# Patient Record
Sex: FEMALE | Race: ASIAN | NOT HISPANIC OR LATINO | Employment: OTHER | URBAN - METROPOLITAN AREA
[De-identification: names, ages, dates, MRNs, and addresses within clinical notes are randomized per-mention and may not be internally consistent; named-entity substitution may affect disease eponyms.]

---

## 2021-07-16 ENCOUNTER — TRANSFERRED RECORDS (OUTPATIENT)
Dept: HEALTH INFORMATION MANAGEMENT | Facility: CLINIC | Age: 86
End: 2021-07-16

## 2021-07-16 ENCOUNTER — LAB REQUISITION (OUTPATIENT)
Dept: LAB | Facility: CLINIC | Age: 86
End: 2021-07-16

## 2021-07-16 ENCOUNTER — MEDICAL CORRESPONDENCE (OUTPATIENT)
Dept: HEALTH INFORMATION MANAGEMENT | Facility: CLINIC | Age: 86
End: 2021-07-16

## 2021-07-16 DIAGNOSIS — R20.2 PARESTHESIA OF SKIN: ICD-10-CM

## 2021-07-16 DIAGNOSIS — F09 UNSPECIFIED MENTAL DISORDER DUE TO KNOWN PHYSIOLOGICAL CONDITION: ICD-10-CM

## 2021-07-16 LAB
ALBUMIN SERPL-MCNC: 3.8 G/DL (ref 3.5–5)
ALP SERPL-CCNC: 84 U/L (ref 45–120)
ALT SERPL W P-5'-P-CCNC: 19 U/L (ref 0–45)
ANION GAP SERPL CALCULATED.3IONS-SCNC: 14 MMOL/L (ref 5–18)
AST SERPL W P-5'-P-CCNC: 19 U/L (ref 0–40)
BILIRUB SERPL-MCNC: 0.5 MG/DL (ref 0–1)
BUN SERPL-MCNC: 25 MG/DL (ref 8–28)
CALCIUM SERPL-MCNC: 9.2 MG/DL (ref 8.5–10.5)
CHLORIDE BLD-SCNC: 106 MMOL/L (ref 98–107)
CO2 SERPL-SCNC: 19 MMOL/L (ref 22–31)
CREAT SERPL-MCNC: 0.68 MG/DL (ref 0.6–1.1)
GFR SERPL CREATININE-BSD FRML MDRD: 80 ML/MIN/1.73M2
GLUCOSE BLD-MCNC: 99 MG/DL (ref 70–125)
MAGNESIUM SERPL-MCNC: 2 MG/DL (ref 1.8–2.6)
POTASSIUM BLD-SCNC: 4.1 MMOL/L (ref 3.5–5)
PROT SERPL-MCNC: 6.8 G/DL (ref 6–8)
SODIUM SERPL-SCNC: 139 MMOL/L (ref 136–145)
TSH SERPL DL<=0.005 MIU/L-ACNC: 0.54 UIU/ML (ref 0.3–5)
VIT B12 SERPL-MCNC: 614 PG/ML (ref 213–816)

## 2021-07-16 PROCEDURE — 82607 VITAMIN B-12: CPT | Performed by: FAMILY MEDICINE

## 2021-07-16 PROCEDURE — 86780 TREPONEMA PALLIDUM: CPT | Performed by: FAMILY MEDICINE

## 2021-07-16 PROCEDURE — 82040 ASSAY OF SERUM ALBUMIN: CPT | Performed by: FAMILY MEDICINE

## 2021-07-16 PROCEDURE — 83735 ASSAY OF MAGNESIUM: CPT | Performed by: FAMILY MEDICINE

## 2021-07-16 PROCEDURE — 84443 ASSAY THYROID STIM HORMONE: CPT | Performed by: FAMILY MEDICINE

## 2021-07-17 LAB — T PALLIDUM AB SER QL: NEGATIVE

## 2021-09-07 ENCOUNTER — TRANSFERRED RECORDS (OUTPATIENT)
Dept: HEALTH INFORMATION MANAGEMENT | Facility: CLINIC | Age: 86
End: 2021-09-07

## 2021-09-16 ENCOUNTER — LAB (OUTPATIENT)
Dept: LAB | Facility: HOSPITAL | Age: 86
End: 2021-09-16
Payer: MEDICARE

## 2021-09-16 ENCOUNTER — OFFICE VISIT (OUTPATIENT)
Dept: NEUROLOGY | Facility: CLINIC | Age: 86
End: 2021-09-16
Payer: MEDICARE

## 2021-09-16 VITALS — HEIGHT: 60 IN | SYSTOLIC BLOOD PRESSURE: 140 MMHG | HEART RATE: 70 BPM | DIASTOLIC BLOOD PRESSURE: 93 MMHG

## 2021-09-16 DIAGNOSIS — R29.810 FACIAL DROOP: Primary | ICD-10-CM

## 2021-09-16 DIAGNOSIS — R52 BURNING PAIN: ICD-10-CM

## 2021-09-16 PROCEDURE — 84165 PROTEIN E-PHORESIS SERUM: CPT | Mod: 26 | Performed by: PATHOLOGY

## 2021-09-16 PROCEDURE — 84425 ASSAY OF VITAMIN B-1: CPT

## 2021-09-16 PROCEDURE — 36415 COLL VENOUS BLD VENIPUNCTURE: CPT

## 2021-09-16 PROCEDURE — 84165 PROTEIN E-PHORESIS SERUM: CPT | Mod: TC

## 2021-09-16 PROCEDURE — 84155 ASSAY OF PROTEIN SERUM: CPT

## 2021-09-16 PROCEDURE — 99205 OFFICE O/P NEW HI 60 MIN: CPT | Performed by: PSYCHIATRY & NEUROLOGY

## 2021-09-16 PROCEDURE — 86334 IMMUNOFIX E-PHORESIS SERUM: CPT | Mod: 26 | Performed by: PATHOLOGY

## 2021-09-16 PROCEDURE — 86334 IMMUNOFIX E-PHORESIS SERUM: CPT | Mod: TC

## 2021-09-16 RX ORDER — PREDNISONE 20 MG/1
TABLET ORAL
COMMUNITY
Start: 2021-09-07

## 2021-09-16 RX ORDER — GABAPENTIN 100 MG/1
CAPSULE ORAL
COMMUNITY
Start: 2021-09-07

## 2021-09-16 RX ORDER — MINERAL OIL AND WHITE PETROLATUM 30; 940 MG/G; MG/G
OINTMENT OPHTHALMIC
COMMUNITY
Start: 2021-09-07 | End: 2022-02-10

## 2021-09-16 RX ORDER — POLYETHYLENE GLYCOL 400 AND PROPYLENE GLYCOL 4; 3 MG/ML; MG/ML
SOLUTION/ DROPS OPHTHALMIC
COMMUNITY
Start: 2021-08-26

## 2021-09-16 RX ORDER — BLOOD SUGAR DIAGNOSTIC
STRIP MISCELLANEOUS
COMMUNITY
Start: 2020-10-05

## 2021-09-16 RX ORDER — VALACYCLOVIR HYDROCHLORIDE 500 MG/1
TABLET, FILM COATED ORAL
COMMUNITY
Start: 2021-09-07 | End: 2022-02-10

## 2021-09-16 NOTE — NURSING NOTE
Chief Complaint   Patient presents with     Numbness     Facial paralysis      Tingling     Burning/hotness on BLE     Ary Cox MA on 9/16/2021 at 11:42 AM

## 2021-09-16 NOTE — PROGRESS NOTES
NEUROLOGY OUTPATIENT CONSULT NOTE   Sep 16, 2021     CHIEF COMPLAINT/REASON FOR VISIT/REASON FOR CONSULT  Patient presents with:  Numbness: Facial paralysis   Tingling: Burning/hotness on BLE    REASON FOR CONSULTATION- Facial droop    REFERRAL SOURCE  Dr. Sugar Weaver   Dr. Sugar Weaver    HISTORY OF PRESENT ILLNESS  Chuckie Burns is a 86 year old female seen today for evaluation of facial droop.  Patient reports that the symptoms came on gradually 11 days ago.  They have been persisting.  She was seen by her primary care doctor and was put on valacyclovir and prednisone which have not made any significant difference.  She has been on these medications for about a week.  The facial droop is on the right side.  Denies any headaches.  Eyes will not bring completely.  Reports no history of hypertension or diabetes.  Does complain of some neck pain.    In addition to the facial droop she also complains of burning feet.  This is more chronic.  She has been put on gabapentin with some benefit significant benefit.  The bottom of the foot and the ankle hard for the last few years.  She does complain of balance and coordination issues.  Does also complain of some generalized weakness and reports that she is not as strong as before    She also has some memory problems though that is not a high priority for the family to get addressed today.  Does complain of some depression as well.    Previous history is reviewed and this is unchanged.    PAST MEDICAL/SURGICAL HISTORY  History reviewed. No pertinent past medical history.  There is no problem list on file for this patient.  Significant for generalized weakness    FAMILY HISTORY  History reviewed. No pertinent family history.   No known neurological problems in the family.  Family history is not well known.    SOCIAL HISTORY  Social History     Tobacco Use     Smoking status: Never Smoker     Smokeless tobacco: Never Used   Substance Use Topics     Alcohol use: Not Currently      Drug use: Never       SYSTEMS REVIEW  Twelve-system ROS was done and positive for neck pain, numbness and tingling, weakness paralysis, difficulty walking, balance coordination problems, dizziness, hearing loss, memory problems, depression.    MEDICATIONS  ACCU-CHEK GUIDE test strip, TEST 1 TIME A DAY  gabapentin (NEURONTIN) 100 MG capsule, TAKE 1-2 PILLS BY MOUTH EVERY BEDTIME AS NEEDED FOR LEG TINGLING/YOG MOB CEG TXHUA HMO NOJ 1-2 LUB THAUM MUS PW  omeprazole (PRILOSEC) 20 MG DR capsule, TAKE 1 PILL BY MOUTH 30 MINUTES BEFORE MORNING MEAL/ TXHUA HNUB NOJ 1 LUB TSHUAJ 30 NASTHIV UA NTEJ NOJ TSHAIS  predniSONE (DELTASONE) 20 MG tablet, TAKE 3 PILLS BY MOUTH EVERY DAY FOR 7 DAYS/ TXHUA HNUB NOJ 3 LUB TSHUAJ BELTRAN 7 HNUB  SYSTANE 0.4-0.3 % SOLN ophthalmic solution, USE 1 DROP INTO BOTH EYES 3 TIMES DAILY/  1 NCOS BELTRAN TXHUA SAB QHOV MUAG 3 ZAUG IB HNUB *45 DAYS*  valACYclovir (VALTREX) 500 MG tablet, TAKE 2 PILLS BY MOUTH 3 TIMES DAILY FOR 7 DAYS/ TXHUA HNUB NOJ 2 LUB TSHUAJ 3 ZAUG BELTRAN 7 HNUB  White Petrolatum-Mineral Oil (SYSTANE NIGHTTIME) OINT,     No current facility-administered medications on file prior to visit.       PHYSICAL EXAMINATION  VITALS: BP (!) 140/93 (BP Location: Right arm, Patient Position: Sitting)   Pulse 70   Ht 1.524 m (5')   GENERAL: Healthy appearing, alert, no acute distress, normal habitus.  CARDIOVASCULAR: Extremities warm and well perfused. Pulses present.   EYES: Funduscopic exam does not reveal any papilledema.   NEUROLOGICAL:  Patient is awake and oriented to self, place and time.  Attention span is normal.  Memory is grossly limited; unable to test due to language barrier.  Language is fluent and follows commands appropriately.  Appropriate fund of knowledge. Cranial nerves 2-12 are intact with right forehead and lower half of the face weakness. There is no pronator drift.  Motor exam shows 5/5 strength in all extremities grossly.  Tone is symmetric bilaterally in upper and  lower extremities.  Reflexes are symmetric and absent in upper extremities and lower extremities. Sensory exam is grossly intact to light touch, pin prick and vibration.  Finger to nose and heel to shin is without dysmetria.  Unable to test Romberg and gait due to safety concerns from ongoing medical issues.    DIAGNOSTICS  RELEVANT LABS  A1c 5.7  CBC noncontributory  Component      Latest Ref Rng & Units 7/16/2021   Sodium      136 - 145 mmol/L 139   Potassium      3.5 - 5.0 mmol/L 4.1   Chloride      98 - 107 mmol/L 106   Carbon Dioxide      22 - 31 mmol/L 19 (L)   Anion Gap      5 - 18 mmol/L 14   Urea Nitrogen      8 - 28 mg/dL 25   Creatinine      0.60 - 1.10 mg/dL 0.68   Calcium      8.5 - 10.5 mg/dL 9.2   Glucose      70 - 125 mg/dL 99   Alkaline Phosphatase      45 - 120 U/L 84   AST      0 - 40 U/L 19   ALT      0 - 45 U/L 19   Protein Total      6.0 - 8.0 g/dL 6.8   Albumin      3.5 - 5.0 g/dL 3.8   Bilirubin Total      0.0 - 1.0 mg/dL 0.5   GFR Estimate      >60 mL/min/1.73m2 80   TSH      0.30 - 5.00 uIU/mL 0.54   Vitamin B12      213 - 816 pg/mL 614   Magnesium      1.8 - 2.6 mg/dL 2.0   Treponema Antibodies      Negative Negative         OUTSIDE RECORDS  Outside referral notes and chart notes were reviewed and pertinent information has been summarized (in addition to the HPI):-Patient referred for burning feet, generalized weakness, dementia. Complains of burning feet for the last 4 to 5 years.  Has a boot on her foot which she wears for foot swelling.  Does not completely fit the foot.  Family is also decided but history of stroke versus Bell's palsy.  Has left hand tingling which is chronic wears a splint.  Tingling is worse at night.  Hand and feet.  There are some history of diabetes.  Is on duloxetine, gabapentin for paresthesias.      IMPRESSION/REPORT/PLAN  Facial droop - r/o Lenorah palsy  Burning pain-rule out neuropathy neuropathic pain    This is a 86 year old female with new onset of right  facial droop.  Examination is suggestive of Bell's palsy though I would like to get a MRI of the brain to rule out stroke.  She is already been treated with valacyclovir and prednisone.  Discussed prognosis with the family.    In terms of the burning pain sensation in her feet her examination is suggestive of neuropathy.  We will check a EMG to confirm the diagnosis.  We will check blood work to look for causes of neuropathy.  Basic labs checked through primary care noncontributory.  She can continue gabapentin for the neuropathic pain.    I can see her back in 1 month after the testing.    -     MR Brain w/o Contrast; Future  -     EMG; Future  -     Protein electrophoresis; Future  -     Protein Immunofixation Serum; Future  -     Vitamin B1 whole blood; Future     Return in about 1 month (around 10/16/2021) for In-Clinic Visit, After testing.    Over 66 minutes were spent coordinating the care for the patient on the day of the encounter.  This includes previsit, during visit and post visit activities as documented above.  Extra time due to use of .  Reviewing outside records for multiple problems addressed/discussed.  Ordering multiple tests.  (Activities include but not inclusive of reviewing chart, reviewing outside records, reviewing labs and imaging study results as well as the images, patient visit time including getting history and exam,  use if applicable, review of test results with the patient and coming up with a plan in a shared model, counseling patient and family, education and answering patient questions, EMR , EMR diagnosis entry and problem list management, medication reconciliation and prescription management if applicable, paperwork if applicable, printing documents and documentation of the visit activities.)  Billing:-4 data points, 4 problem points, 4 risk points.      Jos Rubio MD  Neurologist  [facility]rology [facility]  Tel:-  [phone]    This note was dictated using voice recognition software.  Any grammatical or context distortions are unintentional and inherent to the software.

## 2021-09-16 NOTE — LETTER
9/16/2021         RE: Chuckie Burns  [address]  [place] [facility] [address]        Dear Colleague,    Thank you for referring your patient, Chuckie Burns, to the [facility] [place]. Please see a copy of my visit note below.    NEUROLOGY OUTPATIENT CONSULT NOTE   Sep 16, 2021     CHIEF COMPLAINT/REASON FOR VISIT/REASON FOR CONSULT  Patient presents with:  Numbness: Facial paralysis   Tingling: Burning/hotness on BLE    REASON FOR CONSULTATION- Facial droop    REFERRAL SOURCE  Dr. Sugar Weaver  CC Dr. Sugar Weaver    HISTORY OF PRESENT ILLNESS  Chuckie Burns is a 86 year old female seen today for evaluation of facial droop.  Patient reports that the symptoms came on gradually 11 days ago.  They have been persisting.  She was seen by her primary care doctor and was put on valacyclovir and prednisone which have not made any significant difference.  She has been on these medications for about a week.  The facial droop is on the right side.  Denies any headaches.  Eyes will not bring completely.  Reports no history of hypertension or diabetes.  Does complain of some neck pain.    In addition to the facial droop she also complains of burning feet.  This is more chronic.  She has been put on gabapentin with some benefit significant benefit.  The bottom of the foot and the ankle hard for the last few years.  She does complain of balance and coordination issues.  Does also complain of some generalized weakness and reports that she is not as strong as before    She also has some memory problems though that is not a high priority for the family to get addressed today.  Does complain of some depression as well.    Previous history is reviewed and this is unchanged.    PAST MEDICAL/SURGICAL HISTORY  History reviewed. No pertinent past medical history.  There is no problem list on file for this patient.  Significant for generalized weakness    FAMILY HISTORY  History reviewed. No pertinent family history.   No  known neurological problems in the family.  Family history is not well known.    SOCIAL HISTORY  Social History     Tobacco Use     Smoking status: Never Smoker     Smokeless tobacco: Never Used   Substance Use Topics     Alcohol use: Not Currently     Drug use: Never       SYSTEMS REVIEW  Twelve-system ROS was done and positive for neck pain, numbness and tingling, weakness paralysis, difficulty walking, balance coordination problems, dizziness, hearing loss, memory problems, depression.    MEDICATIONS  ACCU-CHEK GUIDE test strip, TEST 1 TIME A DAY  gabapentin (NEURONTIN) 100 MG capsule, TAKE 1-2 PILLS BY MOUTH EVERY BEDTIME AS NEEDED FOR LEG TINGLING/YOG MOB CEG TXHUA HMO NOJ 1-2 LUB THAUM MUS PW  omeprazole (PRILOSEC) 20 MG DR capsule, TAKE 1 PILL BY MOUTH 30 MINUTES BEFORE MORNING MEAL/ TXHUA HNUB NOJ 1 LUB TSHUAJ 30 NASTHIV UA NTEJ NOJ TSHAIS  predniSONE (DELTASONE) 20 MG tablet, TAKE 3 PILLS BY MOUTH EVERY DAY FOR 7 DAYS/ TXHUA HNUB NOJ 3 LUB TSHUAJ BELTRAN 7 HNUB  SYSTANE 0.4-0.3 % SOLN ophthalmic solution, USE 1 DROP INTO BOTH EYES 3 TIMES DAILY/  1 NCOS BELTRAN TXHUA SAB QHOV MUAG 3 ZAUG IB HNUB *45 DAYS*  valACYclovir (VALTREX) 500 MG tablet, TAKE 2 PILLS BY MOUTH 3 TIMES DAILY FOR 7 DAYS/ TXHUA HNUB NOJ 2 LUB TSHUAJ 3 ZAUG BELTRAN 7 HNUB  White Petrolatum-Mineral Oil (SYSTANE NIGHTTIME) OINT,     No current facility-administered medications on file prior to visit.       PHYSICAL EXAMINATION  VITALS: BP (!) 140/93 (BP Location: Right arm, Patient Position: Sitting)   Pulse 70   Ht 1.524 m (5')   GENERAL: Healthy appearing, alert, no acute distress, normal habitus.  CARDIOVASCULAR: Extremities warm and well perfused. Pulses present.   EYES: Funduscopic exam does not reveal any papilledema.   NEUROLOGICAL:  Patient is awake and oriented to self, place and time.  Attention span is normal.  Memory is grossly limited; unable to test due to language barrier.  Language is fluent and follows commands appropriately.   Appropriate fund of knowledge. Cranial nerves 2-12 are intact with right forehead and lower half of the face weakness. There is no pronator drift.  Motor exam shows 5/5 strength in all extremities grossly.  Tone is symmetric bilaterally in upper and lower extremities.  Reflexes are symmetric and absent in upper extremities and lower extremities. Sensory exam is grossly intact to light touch, pin prick and vibration.  Finger to nose and heel to shin is without dysmetria.  Unable to test Romberg and gait due to safety concerns from ongoing medical issues.    DIAGNOSTICS  RELEVANT LABS  A1c 5.7  CBC noncontributory  Component      Latest Ref Rng & Units 7/16/2021   Sodium      136 - 145 mmol/L 139   Potassium      3.5 - 5.0 mmol/L 4.1   Chloride      98 - 107 mmol/L 106   Carbon Dioxide      22 - 31 mmol/L 19 (L)   Anion Gap      5 - 18 mmol/L 14   Urea Nitrogen      8 - 28 mg/dL 25   Creatinine      0.60 - 1.10 mg/dL 0.68   Calcium      8.5 - 10.5 mg/dL 9.2   Glucose      70 - 125 mg/dL 99   Alkaline Phosphatase      45 - 120 U/L 84   AST      0 - 40 U/L 19   ALT      0 - 45 U/L 19   Protein Total      6.0 - 8.0 g/dL 6.8   Albumin      3.5 - 5.0 g/dL 3.8   Bilirubin Total      0.0 - 1.0 mg/dL 0.5   GFR Estimate      >60 mL/min/1.73m2 80   TSH      0.30 - 5.00 uIU/mL 0.54   Vitamin B12      213 - 816 pg/mL 614   Magnesium      1.8 - 2.6 mg/dL 2.0   Treponema Antibodies      Negative Negative         OUTSIDE RECORDS  Outside referral notes and chart notes were reviewed and pertinent information has been summarized (in addition to the HPI):-Patient referred for burning feet, generalized weakness, dementia. Complains of burning feet for the last 4 to 5 years.  Has a boot on her foot which she wears for foot swelling.  Does not completely fit the foot.  Family is also decided but history of stroke versus Bell's palsy.  Has left hand tingling which is chronic wears a splint.  Tingling is worse at night.  Hand and feet.   There are some history of diabetes.  Is on duloxetine, gabapentin for paresthesias.      IMPRESSION/REPORT/PLAN  Facial droop - r/o Gabriels palsy  Burning pain-rule out neuropathy neuropathic pain    This is a 86 year old female with new onset of right facial droop.  Examination is suggestive of Bell's palsy though I would like to get a MRI of the brain to rule out stroke.  She is already been treated with valacyclovir and prednisone.  Discussed prognosis with the family.    In terms of the burning pain sensation in her feet her examination is suggestive of neuropathy.  We will check a EMG to confirm the diagnosis.  We will check blood work to look for causes of neuropathy.  Basic labs checked through primary care noncontributory.  She can continue gabapentin for the neuropathic pain.    I can see her back in 1 month after the testing.    -     MR Brain w/o Contrast; Future  -     EMG; Future  -     Protein electrophoresis; Future  -     Protein Immunofixation Serum; Future  -     Vitamin B1 whole blood; Future     Return in about 1 month (around 10/16/2021) for In-Clinic Visit, After testing.    Over 66 minutes were spent coordinating the care for the patient on the day of the encounter.  This includes previsit, during visit and post visit activities as documented above.  Extra time due to use of .  Reviewing outside records for multiple problems addressed/discussed.  Ordering multiple tests.  (Activities include but not inclusive of reviewing chart, reviewing outside records, reviewing labs and imaging study results as well as the images, patient visit time including getting history and exam,  use if applicable, review of test results with the patient and coming up with a plan in a shared model, counseling patient and family, education and answering patient questions, EMR , EMR diagnosis entry and problem list management, medication reconciliation and prescription management if  applicable, paperwork if applicable, printing documents and documentation of the visit activities.)  Billing:-4 data points, 4 problem points, 4 risk points.      Jos Rubio MD  Neurologist  [facility]rology [facility]  Tel:- [phone]    This note was dictated using voice recognition software.  Any grammatical or context distortions are unintentional and inherent to the software.        Again, thank you for allowing me to participate in the care of your patient.        Sincerely,        Jos Rubio MD

## 2021-09-17 LAB — TOTAL PROTEIN SERUM FOR ELP: 6.7 G/DL (ref 6–8)

## 2021-09-20 LAB
ALBUMIN PERCENT: 61.8 % (ref 51–67)
ALBUMIN SERPL ELPH-MCNC: 4.1 G/DL (ref 3.2–4.7)
ALPHA 1 PERCENT: 2.2 % (ref 2–4)
ALPHA 2 PERCENT: 9.8 % (ref 5–13)
ALPHA1 GLOB SERPL ELPH-MCNC: 0.1 G/DL (ref 0.1–0.3)
ALPHA2 GLOB SERPL ELPH-MCNC: 0.7 G/DL (ref 0.4–0.9)
B-GLOBULIN SERPL ELPH-MCNC: 0.8 G/DL (ref 0.7–1.2)
BETA PERCENT: 11.2 % (ref 10–17)
GAMMA GLOB SERPL ELPH-MCNC: 1 G/DL (ref 0.6–1.4)
GAMMA GLOBULIN PERCENT: 15 % (ref 9–20)
PATH ICD:: NORMAL
PATH ICD:: NORMAL
PROT PATTERN SERPL ELPH-IMP: NORMAL
PROT PATTERN SERPL IFE-IMP: NORMAL
REVIEWING PATHOLOGIST: NORMAL
REVIEWING PATHOLOGIST: NORMAL
TOTAL PROTEIN SERUM FOR ELP (SYNCED VALUE): 6.7 G/DL

## 2021-09-21 LAB — VIT B1 PYROPHOSHATE BLD-SCNC: 125 NMOL/L

## 2021-09-29 ENCOUNTER — HOSPITAL ENCOUNTER (OUTPATIENT)
Dept: MRI IMAGING | Facility: CLINIC | Age: 86
Discharge: HOME | End: 2021-09-29
Attending: PSYCHIATRY & NEUROLOGY | Admitting: PSYCHIATRY & NEUROLOGY
Payer: MEDICARE

## 2021-09-29 DIAGNOSIS — R29.810 FACIAL DROOP: ICD-10-CM

## 2021-09-29 PROCEDURE — 70551 MRI BRAIN STEM W/O DYE: CPT

## 2022-02-10 ENCOUNTER — OFFICE VISIT (OUTPATIENT)
Dept: NEUROLOGY | Facility: CLINIC | Age: 87
End: 2022-02-10
Attending: PSYCHIATRY & NEUROLOGY
Payer: MEDICARE

## 2022-02-10 ENCOUNTER — OFFICE VISIT (OUTPATIENT)
Dept: NEUROLOGY | Facility: CLINIC | Age: 87
End: 2022-02-10
Payer: MEDICARE

## 2022-02-10 VITALS
SYSTOLIC BLOOD PRESSURE: 139 MMHG | BODY MASS INDEX: 27.29 KG/M2 | DIASTOLIC BLOOD PRESSURE: 87 MMHG | HEART RATE: 61 BPM | HEIGHT: 60 IN | WEIGHT: 139 LBS

## 2022-02-10 DIAGNOSIS — G62.9 NEUROPATHY: ICD-10-CM

## 2022-02-10 DIAGNOSIS — R52 BURNING PAIN: ICD-10-CM

## 2022-02-10 DIAGNOSIS — I63.9 CEREBROVASCULAR ACCIDENT (CVA), UNSPECIFIED MECHANISM (H): Primary | ICD-10-CM

## 2022-02-10 DIAGNOSIS — R20.0 NUMBNESS: Primary | ICD-10-CM

## 2022-02-10 DIAGNOSIS — R29.810 FACIAL DROOP: ICD-10-CM

## 2022-02-10 PROCEDURE — 95886 MUSC TEST DONE W/N TEST COMP: CPT | Mod: RT | Performed by: PSYCHIATRY & NEUROLOGY

## 2022-02-10 PROCEDURE — 95912 NRV CNDJ TEST 11-12 STUDIES: CPT | Performed by: PSYCHIATRY & NEUROLOGY

## 2022-02-10 PROCEDURE — 99215 OFFICE O/P EST HI 40 MIN: CPT | Performed by: PSYCHIATRY & NEUROLOGY

## 2022-02-10 RX ORDER — ASPIRIN 81 MG/1
81 TABLET, CHEWABLE ORAL DAILY
Qty: 90 TABLET | Refills: 1 | Status: SHIPPED | OUTPATIENT
Start: 2022-02-10 | End: 2023-07-10

## 2022-02-10 RX ORDER — GABAPENTIN 300 MG/1
300 CAPSULE ORAL 3 TIMES DAILY
Qty: 270 CAPSULE | Refills: 0 | Status: SHIPPED | OUTPATIENT
Start: 2022-02-10 | End: 2023-01-03

## 2022-02-10 ASSESSMENT — MIFFLIN-ST. JEOR: SCORE: 992

## 2022-02-10 NOTE — PROCEDURES
[facility]     [facility]ly [facility], [address]  [place] [address]  Tel: [phone]  Fax: [phone]          Full Name: Chuckie Burns Gender: Female  Patient ID: [id number] YOB: 1935      Visit Date: 2/10/2022 11:41  Age: 87 Years 1 Months Old  Interpreted By: Jos Rubio MD   Ref Dr.: Sugar Acevedo MD  Tech: ST   Height: 4 feet 9 inch  Reason for referral: Evaluate bilateral lowers. c/o burning in both feet > 20 years. Right = Left.       Motor NCS      Nerve / Sites Lat Amp Dist Bryon    ms mV cm m/s   R Peroneal - EDB      Ankle 3.49 6.4 8       Fib head 8.75 6.0 25.5 48      Pop fossa 10.83 5.8 10 48   L Peroneal - EDB      Ankle 4.11 1.1 8       Fib head 9.01 1.0 24 49      Pop fossa 11.09 0.9 10 48   R Tibial - AH      Ankle 4.17 5.3 8       Pop fossa 11.25 4.4 32 45   L Tibial - AH      Ankle 3.75 6.9 8       Pop fossa 10.52 5.7 29 43       F  Wave      Nerve Fmin    ms   R Tibial - AH 48.28   L Tibial - AH 47.66       Sensory NCS      Nerve / Sites Onset Lat Pk Lat Amp.2-3 Dist Bryon    ms ms  V cm m/s   R Sural - Ankle (Calf)      Calf 2.76 3.39 10.1 14 51   L Sural - Ankle (Calf)      Calf 2.76 3.44 8.7 14 51   R Superficial peroneal - Ankle      Lat leg 1.93 2.60 9.6 12 62   L Superficial peroneal - Ankle      Lat leg 2.08 2.55 8.2 12 58   L Medial plantar, Lateral plantar - Ankle (Medial, lateral sole)      Medial plantar Sole 2.60 3.39 2.9 14 54      Lateral plantar Sole 2.66 3.13 2.3 14 53   R Medial plantar, Lateral plantar - Ankle (Medial, lateral sole)      Medial plantar Sole 2.03 2.86 5.3 14 69      Lateral plantar Sole 1.98 2.50 6.3 14 71       EMG Summary Table     Spontaneous MUAP Rcmt Note   Muscle Fib PSW Fasc IA # Amp Dur PPP Rate Type   R. Gluteus medius None None None N N N N N N N   R. Gluteus aimee None None None N N N N N N N   R. L3 paraspinal None None None N N N N N N N   R. L4 paraspinal  None None None N N N N N N N   R. L5 paraspinal None None None N N N N N N N   R. S1 paraspinal None None None N N N N N N N   R. Adductor houston None None None N N N N N N N   R. Quadriceps None None None N N N N N N N   R. Tibialis anterior None None None N N N N N N N   R. Gastrocnemius (Medial head) None None None N N N N N N N   R. Tibialis posterior None None None N N N N N N N   L. Adductor houston None None None N N N N N N N   L. Quadriceps None None None N N N N N N N   L. Tibialis anterior None None None N N N N N N N   L. Gastrocnemius (Medial head) None None None N N N N N N N   L. Tibialis posterior None None None N N N N N N N       SUMMARY  Nerve conduction and EMG study of bilateral lower extremities shows:    Normal right peroneal distal motor latency, amplitude and conduction velocity.  Normal left peroneal distal motor latency with decreased amplitude and normal conduction velocity.  Normal right tibial distal motor latency, amplitude and conduction velocity.  Normal left tibial distal motor latency, amplitude and conduction velocity.  Normal bilateral sural and superficial peroneal sensory SNAP with low normal amplitudes.  Low normal amplitudes of bilateral medial and lateral plantar nerves.  Monopolar needle exam is normal.      CLINICAL INTERPRETATION:  This is an abnormal nerve conduction and EMG study.  The study shows low normal amplitudes of multiple sensory and motor nerves suggestive of an early sensorimotor polyneuropathy.  Further clinical correlation is needed.     Jos Rubio MD  Neurologist  [facility] Neurology [facility]  Tel:- [phone]

## 2022-02-10 NOTE — PROGRESS NOTES
NEUROLOGY OUTPATIENT PROGRESS NOTE   Feb 10, 2022     CHIEF COMPLAINT/REASON FOR VISIT/REASON FOR CONSULT  Patient presents with:  RECHECK: facial droop and numbness    REASON FOR CONSULTATION- Facial droop    REFERRAL SOURCE  Dr. Sugar Weaver   Dr. Sugar Weaver    HISTORY OF PRESENT ILLNESS  Chuckie Burns is a 87 year old female seen today for evaluation of facial droop.  Patient reports that the symptoms came on gradually 11 days ago.  They have been persisting.  She was seen by her primary care doctor and was put on valacyclovir and prednisone which have not made any significant difference.  She has been on these medications for about a week.  The facial droop is on the right side.  Denies any headaches.  Eyes will not bring completely.  Reports no history of hypertension or diabetes.  Does complain of some neck pain.    In addition to the facial droop she also complains of burning feet.  This is more chronic.  She has been put on gabapentin with some benefit significant benefit.  The bottom of the foot and the ankle hard for the last few years.  She does complain of balance and coordination issues.  Does also complain of some generalized weakness and reports that she is not as strong as before    She also has some memory problems though that is not a high priority for the family to get addressed today.  Does complain of some depression as well.    2/10/22  And returns today  1.  Her facial droop is now completely resolved.  Reports no ongoing symptoms.  Reports no other strokelike symptoms.  No headaches.  2.  She continues to have burning pain in her feet.  Has tried gabapentin which she feels is helping  3.  Continues to complain of balance and coordination issues.    Previous history is reviewed and this is unchanged.    PAST MEDICAL/SURGICAL HISTORY  No past medical history on file.  There is no problem list on file for this patient.  Significant for generalized weakness    FAMILY HISTORY  No family history  on file.   No known neurological problems in the family.  Family history is not well known.    SOCIAL HISTORY  Social History     Tobacco Use     Smoking status: Never Smoker     Smokeless tobacco: Never Used   Substance Use Topics     Alcohol use: Not Currently     Drug use: Never       SYSTEMS REVIEW  Twelve-system ROS was done and positive for neck pain, numbness and tingling, weakness paralysis, difficulty walking, balance coordination problems, dizziness, hearing loss, memory problems, depression.  No new issues or concerns today    MEDICATIONS  ACCU-CHEK GUIDE test strip, TEST 1 TIME A DAY  gabapentin (NEURONTIN) 100 MG capsule, TAKE 1-2 PILLS BY MOUTH EVERY BEDTIME AS NEEDED FOR LEG TINGLING/YOG MOB CEG TXHUA HMO NOJ 1-2 LUB THAUM MUS PW  omeprazole (PRILOSEC) 20 MG DR capsule, TAKE 1 PILL BY MOUTH 30 MINUTES BEFORE MORNING MEAL/ TXHUA HNUB NOJ 1 LUB TSHUAJ 30 NASTHIV UA NTEJ NOJ TSHAIS  predniSONE (DELTASONE) 20 MG tablet, TAKE 3 PILLS BY MOUTH EVERY DAY FOR 7 DAYS/ TXHUA HNUB NOJ 3 LUB TSHUAJ BELTRAN 7 HNUB  SYSTANE 0.4-0.3 % SOLN ophthalmic solution, USE 1 DROP INTO BOTH EYES 3 TIMES DAILY/  1 NCOS BELTRAN TXHUA SAB QHOV MUAG 3 ZAUG IB HNUB *45 DAYS*    No current facility-administered medications on file prior to visit.       PHYSICAL EXAMINATION  VITALS: /87 (BP Location: Left arm, Patient Position: Sitting)   Pulse 61   Ht 1.524 m (5')   Wt 63 kg (139 lb)   BMI 27.15 kg/m    GENERAL: Healthy appearing, alert, no acute distress, normal habitus.  CARDIOVASCULAR: Extremities warm and well perfused. Pulses present.   EYES: Funduscopic exam does not reveal any papilledema.   NEUROLOGICAL:  Patient is awake and oriented to self, place and time.  Attention span is normal.  Memory is grossly limited; unable to test due to language barrier.  Language is fluent and follows commands appropriately.  Appropriate fund of knowledge. Cranial nerves 2-12 are intact.  No facial droop noted today.  (Previously with  right forehead and lower half of the face weakness.) There is no pronator drift.  Motor exam shows 5/5 strength in all extremities grossly.  Tone is symmetric bilaterally in upper and lower extremities.  (Previously reflexes are symmetric and absent in upper extremities and lower extremities. Sensory exam is grossly intact to light touch, pin prick and vibration.)  Finger to nose and heel to shin is without dysmetria.  Unable to test Romberg and gait due to safety concerns from ongoing medical issues.  Exam unchanged compared to before.    DIAGNOSTICS  RELEVANT LABS  A1c 5.7  CBC noncontributory  Component      Latest Ref Rng & Units 7/16/2021   Sodium      136 - 145 mmol/L 139   Potassium      3.5 - 5.0 mmol/L 4.1   Chloride      98 - 107 mmol/L 106   Carbon Dioxide      22 - 31 mmol/L 19 (L)   Anion Gap      5 - 18 mmol/L 14   Urea Nitrogen      8 - 28 mg/dL 25   Creatinine      0.60 - 1.10 mg/dL 0.68   Calcium      8.5 - 10.5 mg/dL 9.2   Glucose      70 - 125 mg/dL 99   Alkaline Phosphatase      45 - 120 U/L 84   AST      0 - 40 U/L 19   ALT      0 - 45 U/L 19   Protein Total      6.0 - 8.0 g/dL 6.8   Albumin      3.5 - 5.0 g/dL 3.8   Bilirubin Total      0.0 - 1.0 mg/dL 0.5   GFR Estimate      >60 mL/min/1.73m2 80   TSH      0.30 - 5.00 uIU/mL 0.54   Vitamin B12      213 - 816 pg/mL 614   Magnesium      1.8 - 2.6 mg/dL 2.0   Treponema Antibodies      Negative Negative         OUTSIDE RECORDS  Outside referral notes and chart notes were reviewed and pertinent information has been summarized (in addition to the HPI):-Patient referred for burning feet, generalized weakness, dementia. Complains of burning feet for the last 4 to 5 years.  Has a boot on her foot which she wears for foot swelling.  Does not completely fit the foot.  Family is also decided but history of stroke versus Bell's palsy.  Has left hand tingling which is chronic wears a splint.  Tingling is worse at night.  Hand and feet.  There are some  history of diabetes.  Is on duloxetine, gabapentin for paresthesias.    MRI-images reviewed.  Left frontal infarct noted.  IMPRESSION:  1.  No evidence of acute intracranial hemorrhage, mass effect, or infarction.  2.  Chronic lacunar within the deep white matter of the left frontal lobe.  3.  Mild nonspecific white matter changes.  4.  Mild brain parenchymal volume loss.    EMG  CLINICAL INTERPRETATION:  This is an abnormal nerve conduction and EMG study.  The study shows low normal amplitudes of multiple sensory and motor nerves suggestive of an early sensorimotor polyneuropathy.  Further clinical correlation is needed.     LABS  Component      Latest Ref Rng & Units 9/16/2021          12:50 PM   Albumin %      51.0 - 67.0 % 61.8   Albumin Fraction      3.2 - 4.7 g/dL 4.1   Alpha 1 %      2.0 - 4.0 % 2.2   Alpha 1 Fraction      0.1 - 0.3 g/dL 0.1   Alpha 2 %      5.0 - 13.0 % 9.8   Alpha 2 Fraction      0.4 - 0.9 g/dL 0.7   Beta %      10.0 - 17.0 % 11.2   Beta Fraction      0.7 - 1.2 g/dL 0.8   Gamma Globulin %      9.0 - 20.0 % 15.0   Gamma Fraction      0.6 - 1.4 g/dL 1.0   ELP Interpretation:       Normal serum protein electrophoresis.   Path ICD       R52   Interpreted By       Vitaliy Limon MD   Total Protein Serum for ELP      g/dL 6.7   Immunofixation ELP          Vitamin B1 Whole Blood Level      70 - 180 nmol/L 125   Total Protein Serum for ELP      6.0 - 8.0 g/dL 6.7     Component      Latest Ref Rng & Units 9/16/2021          12:50 PM   Albumin %      51.0 - 67.0 %    Albumin Fraction      3.2 - 4.7 g/dL    Alpha 1 %      2.0 - 4.0 %    Alpha 1 Fraction      0.1 - 0.3 g/dL    Alpha 2 %      5.0 - 13.0 %    Alpha 2 Fraction      0.4 - 0.9 g/dL    Beta %      10.0 - 17.0 %    Beta Fraction      0.7 - 1.2 g/dL    Gamma Globulin %      9.0 - 20.0 %    Gamma Fraction      0.6 - 1.4 g/dL    ELP Interpretation:          Path ICD       R52   Interpreted By       Vitaliy Limon MD   Total Protein  Serum for ELP      g/dL    Immunofixation ELP       No monoclonal component identified.   Vitamin B1 Whole Blood Level      70 - 180 nmol/L    Total Protein Serum for ELP      6.0 - 8.0 g/dL          IMPRESSION/REPORT/PLAN  Facial droop -  [place] palsy-improved  Burning pain-suspect neuropathic pain  Stroke on MRI  Neuropathy      This is a 87 year old female with new onset of right facial droop.  Examination previously was suggestive of a lower motor neuron pattern.  MRI brain has come back negative for stroke that would fit with this facial droop.  Patient was treated with valacyclovir and prednisone.  She is currently asymptomatic as expected with a Bell's palsy.  Discussed prognosis with the patient and family.    In terms of burning sensation in her feet.  Examination suggestive of neuropathy.  EMG is confirmatory for neuropathy blood work overall has been negative and I suspect she has idiopathic neuropathy.  For neuropathic pain gabapentin is helpful and will continue that.  We will increase the dose as patient requested.  Discussed prognosis.    Her MRI does show chronic infarct that does not fit with her symptoms.  I would complete the stroke work-up and will get an ultrasound of the carotid arteries, echocardiogram and Holter monitor.  We will further check for diabetes and lipid panel for hyperlipidemia.  We will start her on aspirin.  Encourage exercise and healthy lifestyle.    Can see her back in 2 months.      -     US Carotid Bilateral  -     Echocardiogram Complete; Future  -     Holter Monitor 24 hour Adult Pediatric; Future  -     Hemoglobin A1c; Future  -     Lipid panel reflex to direct LDL Fasting; Future  -     aspirin (ASA) 81 MG chewable tablet; Take 1 tablet (81 mg) by mouth daily  -     gabapentin (NEURONTIN) 300 MG capsule; Take 1 capsule (300 mg) by mouth 3 times daily      Return in about 2 months (around 4/10/2022) for In-Clinic Visit (must), After testing.    Over 45 minutes were spent  coordinating the care for the patient on the day of the encounter.  This includes previsit, during visit and post visit activities as documented above.  MRI images reviewed.  Counseling patient.  Multiple problems addressed.  Prescription management.  Multiple test ordered.  Extra time due to use of .  New problem.  (Activities include but not inclusive of reviewing chart, reviewing outside records, reviewing labs and imaging study results as well as the images, patient visit time including getting history and exam,  use if applicable, review of test results with the patient and coming up with a plan in a shared model, counseling patient and family, education and answering patient questions, EMR , EMR diagnosis entry and problem list management, medication reconciliation and prescription management if applicable, paperwork if applicable, printing documents and documentation of the visit activities.)      Jos Rubio MD  Neurologist  [facility]rology [facility]  Tel:- [phone]    This note was dictated using voice recognition software.  Any grammatical or context distortions are unintentional and inherent to the software.

## 2022-02-10 NOTE — LETTER
2/10/2022         RE: Chuckie Burns  [address]  [place] [address]        Dear Colleague,    Thank you for referring your patient, Chuckie Burns, to the [facility] [place]. Please see a copy of my visit note below.    NEUROLOGY OUTPATIENT PROGRESS NOTE   Feb 10, 2022     CHIEF COMPLAINT/REASON FOR VISIT/REASON FOR CONSULT  Patient presents with:  RECHECK: facial droop and numbness    REASON FOR CONSULTATION- Facial droop    REFERRAL SOURCE  Dr. Sugar Weaver  CC Dr. Sugar Weaver    HISTORY OF PRESENT ILLNESS  Chuckie Burns is a 87 year old female seen today for evaluation of facial droop.  Patient reports that the symptoms came on gradually 11 days ago.  They have been persisting.  She was seen by her primary care doctor and was put on valacyclovir and prednisone which have not made any significant difference.  She has been on these medications for about a week.  The facial droop is on the right side.  Denies any headaches.  Eyes will not bring completely.  Reports no history of hypertension or diabetes.  Does complain of some neck pain.    In addition to the facial droop she also complains of burning feet.  This is more chronic.  She has been put on gabapentin with some benefit significant benefit.  The bottom of the foot and the ankle hard for the last few years.  She does complain of balance and coordination issues.  Does also complain of some generalized weakness and reports that she is not as strong as before    She also has some memory problems though that is not a high priority for the family to get addressed today.  Does complain of some depression as well.    2/10/22  And returns today  1.  Her facial droop is now completely resolved.  Reports no ongoing symptoms.  Reports no other strokelike symptoms.  No headaches.  2.  She continues to have burning pain in her feet.  Has tried gabapentin which she feels is helping  3.  Continues to complain of balance and coordination  issues.    Previous history is reviewed and this is unchanged.    PAST MEDICAL/SURGICAL HISTORY  No past medical history on file.  There is no problem list on file for this patient.  Significant for generalized weakness    FAMILY HISTORY  No family history on file.   No known neurological problems in the family.  Family history is not well known.    SOCIAL HISTORY  Social History     Tobacco Use     Smoking status: Never Smoker     Smokeless tobacco: Never Used   Substance Use Topics     Alcohol use: Not Currently     Drug use: Never       SYSTEMS REVIEW  Twelve-system ROS was done and positive for neck pain, numbness and tingling, weakness paralysis, difficulty walking, balance coordination problems, dizziness, hearing loss, memory problems, depression.  No new issues or concerns today    MEDICATIONS  ACCU-CHEK GUIDE test strip, TEST 1 TIME A DAY  gabapentin (NEURONTIN) 100 MG capsule, TAKE 1-2 PILLS BY MOUTH EVERY BEDTIME AS NEEDED FOR LEG TINGLING/YOG MOB CEG TXHUA HMO NOJ 1-2 LUB THAUM MUS PW  omeprazole (PRILOSEC) 20 MG DR capsule, TAKE 1 PILL BY MOUTH 30 MINUTES BEFORE MORNING MEAL/ TXHUA HNUB NOJ 1 LUB TSHUAJ 30 NASTHIV UA NTEJ NOJ TSHAIS  predniSONE (DELTASONE) 20 MG tablet, TAKE 3 PILLS BY MOUTH EVERY DAY FOR 7 DAYS/ TXHUA HNUB NOJ 3 LUB TSHUAJ BELTRAN 7 HNUB  SYSTANE 0.4-0.3 % SOLN ophthalmic solution, USE 1 DROP INTO BOTH EYES 3 TIMES DAILY/ [place] 1 NCOS BELTRAN TXHUA SAB QHOV MUAG 3 ZAUG IB HNUB *45 DAYS*    No current facility-administered medications on file prior to visit.       PHYSICAL EXAMINATION  VITALS: /87 (BP Location: Left arm, Patient Position: Sitting)   Pulse 61   Ht 1.524 m (5')   Wt 63 kg (139 lb)   BMI 27.15 kg/m    GENERAL: Healthy appearing, alert, no acute distress, normal habitus.  CARDIOVASCULAR: Extremities warm and well perfused. Pulses present.   EYES: Funduscopic exam does not reveal any papilledema.   NEUROLOGICAL:  Patient is awake and oriented to self, place and time.   Attention span is normal.  Memory is grossly limited; unable to test due to language barrier.  Language is fluent and follows commands appropriately.  Appropriate fund of knowledge. Cranial nerves 2-12 are intact.  No facial droop noted today.  (Previously with right forehead and lower half of the face weakness.) There is no pronator drift.  Motor exam shows 5/5 strength in all extremities grossly.  Tone is symmetric bilaterally in upper and lower extremities.  (Previously reflexes are symmetric and absent in upper extremities and lower extremities. Sensory exam is grossly intact to light touch, pin prick and vibration.)  Finger to nose and heel to shin is without dysmetria.  Unable to test Romberg and gait due to safety concerns from ongoing medical issues.  Exam unchanged compared to before.    DIAGNOSTICS  RELEVANT LABS  A1c 5.7  CBC noncontributory  Component      Latest Ref Rng & Units 7/16/2021   Sodium      136 - 145 mmol/L 139   Potassium      3.5 - 5.0 mmol/L 4.1   Chloride      98 - 107 mmol/L 106   Carbon Dioxide      22 - 31 mmol/L 19 (L)   Anion Gap      5 - 18 mmol/L 14   Urea Nitrogen      8 - 28 mg/dL 25   Creatinine      0.60 - 1.10 mg/dL 0.68   Calcium      8.5 - 10.5 mg/dL 9.2   Glucose      70 - 125 mg/dL 99   Alkaline Phosphatase      45 - 120 U/L 84   AST      0 - 40 U/L 19   ALT      0 - 45 U/L 19   Protein Total      6.0 - 8.0 g/dL 6.8   Albumin      3.5 - 5.0 g/dL 3.8   Bilirubin Total      0.0 - 1.0 mg/dL 0.5   GFR Estimate      >60 mL/min/1.73m2 80   TSH      0.30 - 5.00 uIU/mL 0.54   Vitamin B12      213 - 816 pg/mL 614   Magnesium      1.8 - 2.6 mg/dL 2.0   Treponema Antibodies      Negative Negative         OUTSIDE RECORDS  Outside referral notes and chart notes were reviewed and pertinent information has been summarized (in addition to the HPI):-Patient referred for burning feet, generalized weakness, dementia. Complains of burning feet for the last 4 to 5 years.  Has a boot on her  foot which she wears for foot swelling.  Does not completely fit the foot.  Family is also decided but history of stroke versus Bell's palsy.  Has left hand tingling which is chronic wears a splint.  Tingling is worse at night.  Hand and feet.  There are some history of diabetes.  Is on duloxetine, gabapentin for paresthesias.    MRI-images reviewed.  Left frontal infarct noted.  IMPRESSION:  1.  No evidence of acute intracranial hemorrhage, mass effect, or infarction.  2.  Chronic lacunar within the deep white matter of the left frontal lobe.  3.  Mild nonspecific white matter changes.  4.  Mild brain parenchymal volume loss.    EMG  CLINICAL INTERPRETATION:  This is an abnormal nerve conduction and EMG study.  The study shows low normal amplitudes of multiple sensory and motor nerves suggestive of an early sensorimotor polyneuropathy.  Further clinical correlation is needed.     LABS  Component      Latest Ref Rng & Units 9/16/2021          12:50 PM   Albumin %      51.0 - 67.0 % 61.8   Albumin Fraction      3.2 - 4.7 g/dL 4.1   Alpha 1 %      2.0 - 4.0 % 2.2   Alpha 1 Fraction      0.1 - 0.3 g/dL 0.1   Alpha 2 %      5.0 - 13.0 % 9.8   Alpha 2 Fraction      0.4 - 0.9 g/dL 0.7   Beta %      10.0 - 17.0 % 11.2   Beta Fraction      0.7 - 1.2 g/dL 0.8   Gamma Globulin %      9.0 - 20.0 % 15.0   Gamma Fraction      0.6 - 1.4 g/dL 1.0   ELP Interpretation:       Normal serum protein electrophoresis.   Path ICD       R52   Interpreted By       Vitaliy Limon MD   Total Protein Serum for ELP      g/dL 6.7   Immunofixation ELP          Vitamin B1 Whole Blood Level      70 - 180 nmol/L 125   Total Protein Serum for ELP      6.0 - 8.0 g/dL 6.7     Component      Latest Ref Rng & Units 9/16/2021          12:50 PM   Albumin %      51.0 - 67.0 %    Albumin Fraction      3.2 - 4.7 g/dL    Alpha 1 %      2.0 - 4.0 %    Alpha 1 Fraction      0.1 - 0.3 g/dL    Alpha 2 %      5.0 - 13.0 %    Alpha 2 Fraction      0.4 - 0.9  g/dL    Beta %      10.0 - 17.0 %    Beta Fraction      0.7 - 1.2 g/dL    Gamma Globulin %      9.0 - 20.0 %    Gamma Fraction      0.6 - 1.4 g/dL    ELP Interpretation:          Path ICD       R52   Interpreted By       Vitaliy Limon MD   Total Protein Serum for ELP      g/dL    Immunofixation ELP       No monoclonal component identified.   Vitamin B1 Whole Blood Level      70 - 180 nmol/L    Total Protein Serum for ELP      6.0 - 8.0 g/dL          IMPRESSION/REPORT/PLAN  Facial droop -  [place] palsy-improved  Burning pain-suspect neuropathic pain  Stroke on MRI  Neuropathy      This is a 87 year old female with new onset of right facial droop.  Examination previously was suggestive of a lower motor neuron pattern.  MRI brain has come back negative for stroke that would fit with this facial droop.  Patient was treated with valacyclovir and prednisone.  She is currently asymptomatic as expected with a Bell's palsy.  Discussed prognosis with the patient and family.    In terms of burning sensation in her feet.  Examination suggestive of neuropathy.  EMG is confirmatory for neuropathy blood work overall has been negative and I suspect she has idiopathic neuropathy.  For neuropathic pain gabapentin is helpful and will continue that.  We will increase the dose as patient requested.  Discussed prognosis.    Her MRI does show chronic infarct that does not fit with her symptoms.  I would complete the stroke work-up and will get an ultrasound of the carotid arteries, echocardiogram and Holter monitor.  We will further check for diabetes and lipid panel for hyperlipidemia.  We will start her on aspirin.  Encourage exercise and healthy lifestyle.    Can see her back in 2 months.      -     US Carotid Bilateral  -     Echocardiogram Complete; Future  -     Holter Monitor 24 hour Adult Pediatric; Future  -     Hemoglobin A1c; Future  -     Lipid panel reflex to direct LDL Fasting; Future  -     aspirin (ASA) 81 MG chewable  tablet; Take 1 tablet (81 mg) by mouth daily  -     gabapentin (NEURONTIN) 300 MG capsule; Take 1 capsule (300 mg) by mouth 3 times daily      Return in about 2 months (around 4/10/2022) for In-Clinic Visit (must), After testing.    Over 45 minutes were spent coordinating the care for the patient on the day of the encounter.  This includes previsit, during visit and post visit activities as documented above.  MRI images reviewed.  Counseling patient.  Multiple problems addressed.  Prescription management.  Multiple test ordered.  Extra time due to use of .  New problem.  (Activities include but not inclusive of reviewing chart, reviewing outside records, reviewing labs and imaging study results as well as the images, patient visit time including getting history and exam,  use if applicable, review of test results with the patient and coming up with a plan in a shared model, counseling patient and family, education and answering patient questions, EMR , EMR diagnosis entry and problem list management, medication reconciliation and prescription management if applicable, paperwork if applicable, printing documents and documentation of the visit activities.)      Jos Rubio MD  Neurologist  [facility] Neurology [facility]  Tel:- [phone]    This note was dictated using voice recognition software.  Any grammatical or context distortions are unintentional and inherent to the software.        Again, thank you for allowing me to participate in the care of your patient.        Sincerely,        Jos Rubio MD

## 2022-02-10 NOTE — NURSING NOTE
Chief Complaint   Patient presents with     RECHECK     facial droop and numbness     Pramod Guillen MA on 2/10/2022 at 12:16 PM

## 2022-02-10 NOTE — LETTER
2/10/2022         RE: Chuckie Burns  [address]  [place] [address]        Dear Colleague,    Thank you for referring your patient, Chcukie Burns, to the [facility] [place]. Please see a copy of my visit note below.    See procedure note.       Again, thank you for allowing me to participate in the care of your patient.        Sincerely,        Jos Rubio MD

## 2023-01-03 ENCOUNTER — OFFICE VISIT (OUTPATIENT)
Dept: NEUROLOGY | Facility: CLINIC | Age: 88
End: 2023-01-03
Payer: COMMERCIAL

## 2023-01-03 ENCOUNTER — LAB REQUISITION (OUTPATIENT)
Dept: LAB | Facility: CLINIC | Age: 88
End: 2023-01-03
Payer: COMMERCIAL

## 2023-01-03 VITALS — DIASTOLIC BLOOD PRESSURE: 92 MMHG | SYSTOLIC BLOOD PRESSURE: 134 MMHG | RESPIRATION RATE: 16 BRPM | HEART RATE: 56 BPM

## 2023-01-03 DIAGNOSIS — I63.9 CEREBROVASCULAR ACCIDENT (CVA), UNSPECIFIED MECHANISM (H): ICD-10-CM

## 2023-01-03 DIAGNOSIS — M79.671 PAIN IN RIGHT FOOT: ICD-10-CM

## 2023-01-03 DIAGNOSIS — M25.571 PAIN IN JOINT INVOLVING ANKLE AND FOOT, RIGHT: ICD-10-CM

## 2023-01-03 DIAGNOSIS — G62.9 NEUROPATHY: Primary | ICD-10-CM

## 2023-01-03 DIAGNOSIS — R52 BURNING PAIN: ICD-10-CM

## 2023-01-03 DIAGNOSIS — R73.9 HYPERGLYCEMIA: ICD-10-CM

## 2023-01-03 LAB
ALBUMIN SERPL BCG-MCNC: 4.3 G/DL (ref 3.5–5.2)
ALP SERPL-CCNC: 82 U/L (ref 35–104)
ALT SERPL W P-5'-P-CCNC: 17 U/L (ref 10–35)
ANION GAP SERPL CALCULATED.3IONS-SCNC: 12 MMOL/L (ref 7–15)
AST SERPL W P-5'-P-CCNC: 17 U/L (ref 10–35)
BASOPHILS # BLD AUTO: 0 10E3/UL (ref 0–0.2)
BASOPHILS NFR BLD AUTO: 1 %
BILIRUB SERPL-MCNC: 0.3 MG/DL
BUN SERPL-MCNC: 23 MG/DL (ref 8–23)
CALCIUM SERPL-MCNC: 9 MG/DL (ref 8.8–10.2)
CHLORIDE SERPL-SCNC: 108 MMOL/L (ref 98–107)
CREAT SERPL-MCNC: 0.7 MG/DL (ref 0.51–0.95)
DEPRECATED HCO3 PLAS-SCNC: 22 MMOL/L (ref 22–29)
EOSINOPHIL # BLD AUTO: 0.1 10E3/UL (ref 0–0.7)
EOSINOPHIL NFR BLD AUTO: 2 %
ERYTHROCYTE [DISTWIDTH] IN BLOOD BY AUTOMATED COUNT: 13 % (ref 10–15)
ERYTHROCYTE [SEDIMENTATION RATE] IN BLOOD BY WESTERGREN METHOD: 10 MM/HR (ref 0–30)
GFR SERPL CREATININE-BSD FRML MDRD: 84 ML/MIN/1.73M2
GLUCOSE SERPL-MCNC: 132 MG/DL (ref 70–99)
HCT VFR BLD AUTO: 40.6 % (ref 35–47)
HGB BLD-MCNC: 13.1 G/DL (ref 11.7–15.7)
IMM GRANULOCYTES # BLD: 0 10E3/UL
IMM GRANULOCYTES NFR BLD: 0 %
LYMPHOCYTES # BLD AUTO: 2.3 10E3/UL (ref 0.8–5.3)
LYMPHOCYTES NFR BLD AUTO: 36 %
MCH RBC QN AUTO: 29 PG (ref 26.5–33)
MCHC RBC AUTO-ENTMCNC: 32.3 G/DL (ref 31.5–36.5)
MCV RBC AUTO: 90 FL (ref 78–100)
MONOCYTES # BLD AUTO: 0.3 10E3/UL (ref 0–1.3)
MONOCYTES NFR BLD AUTO: 5 %
NEUTROPHILS # BLD AUTO: 3.5 10E3/UL (ref 1.6–8.3)
NEUTROPHILS NFR BLD AUTO: 56 %
NRBC # BLD AUTO: 0 10E3/UL
NRBC BLD AUTO-RTO: 0 /100
PLATELET # BLD AUTO: 219 10E3/UL (ref 150–450)
POTASSIUM SERPL-SCNC: 4 MMOL/L (ref 3.4–5.3)
PROT SERPL-MCNC: 6.6 G/DL (ref 6.4–8.3)
RBC # BLD AUTO: 4.51 10E6/UL (ref 3.8–5.2)
SODIUM SERPL-SCNC: 142 MMOL/L (ref 136–145)
URATE SERPL-MCNC: 5.8 MG/DL (ref 2.4–5.7)
WBC # BLD AUTO: 6.3 10E3/UL (ref 4–11)

## 2023-01-03 PROCEDURE — 85025 COMPLETE CBC W/AUTO DIFF WBC: CPT | Mod: ORL | Performed by: FAMILY MEDICINE

## 2023-01-03 PROCEDURE — 85652 RBC SED RATE AUTOMATED: CPT | Mod: ORL | Performed by: FAMILY MEDICINE

## 2023-01-03 PROCEDURE — 80053 COMPREHEN METABOLIC PANEL: CPT | Mod: ORL | Performed by: FAMILY MEDICINE

## 2023-01-03 PROCEDURE — 99214 OFFICE O/P EST MOD 30 MIN: CPT | Performed by: PSYCHIATRY & NEUROLOGY

## 2023-01-03 PROCEDURE — 84550 ASSAY OF BLOOD/URIC ACID: CPT | Mod: ORL | Performed by: FAMILY MEDICINE

## 2023-01-03 RX ORDER — GABAPENTIN 300 MG/1
CAPSULE ORAL
Qty: 360 CAPSULE | Refills: 1 | Status: SHIPPED | OUTPATIENT
Start: 2023-01-03 | End: 2023-07-10

## 2023-01-03 NOTE — LETTER
January 6, 2023      Chuckie Burns  [address]  [facility] [address]        Dear ,    We are writing to inform you of your test results.    The carotid ultrasound does not show any significant blockage.    Resulted Orders   US Carotid Bilateral    Narrative    EXAM: US CAROTID BILATERAL  LOCATION: [facility]  DATE/TIME: 1/6/2023 10:10 AM    INDICATION: CVA, carotid bruit.  COMPARISON: None.  TECHNIQUE: Duplex exam performed utilizing 2D gray-scale imaging, Doppler interrogation with color-flow and spectral waveform analysis. The percent diameter stenosis is determined using NASCET criteria and Society of Radiologists in Ultrasound Consensus   Criteria.    FINDINGS:    RIGHT: Mild plaque at the bifurcation. The peak systolic velocity in the ICA is less than 125 cm/sec, consistent with less than 50% stenosis. Normal velocities in the ECA. Antegrade flow within the vertebral artery.     LEFT: Mild plaque at the bifurcation. The peak systolic velocity in the ICA is less than 125 cm/sec, consistent with less than 50% stenosis. Normal velocities in the ECA. Antegrade flow within the vertebral artery.    VELOCITY CHART:  CCA   Right: 68.5/24.1 cm/s   Left: 63.7/21.5 cm/s  ICA   Right: 65.4/24.9 cm/s   Left: 68.0/25.5 cm/s  ECA   Right: 1.1/26.1 cm/s   Left: 67.6/18.9 cm/s  ICA/CCA PSV Ratio   Right: 1.0/1.0   Left: 1.1/1.2      Impression    IMPRESSION:  1.  Mild plaque formation, velocities consistent with less than 50% stenosis in the right internal carotid artery.  2.  Mild plaque formation, velocities consistent with less than 50% stenosis in the left internal carotid artery.  3.  Flow within the vertebral arteries is antegrade.       If you have any questions or concerns, please call the clinic at the number listed above.       Sincerely,      Jos Rubio MD

## 2023-01-03 NOTE — LETTER
1/3/2023         RE: Chuckie Burns  [address]  [place] [address]        Dear Colleague,    Thank you for referring your patient, Chuckie Burns, to the [facility] [place]. Please see a copy of my visit note below.    NEUROLOGY OUTPATIENT PROGRESS NOTE   Mick 3, 2023     CHIEF COMPLAINT/REASON FOR VISIT/REASON FOR CONSULT  Patient presents with:  Follow Up    REASON FOR CONSULTATION- Facial droop    REFERRAL SOURCE  Dr. Sugar Weaver  CC Dr. Sugar Weaver    HISTORY OF PRESENT ILLNESS  Chuckie Burns is a 87 year old female seen today for evaluation of facial droop.  Patient reports that the symptoms came on gradually 11 days ago.  They have been persisting.  She was seen by her primary care doctor and was put on valacyclovir and prednisone which have not made any significant difference.  She has been on these medications for about a week.  The facial droop is on the right side.  Denies any headaches.  Eyes will not bring completely.  Reports no history of hypertension or diabetes.  Does complain of some neck pain.    In addition to the facial droop she also complains of burning feet.  This is more chronic.  She has been put on gabapentin with some benefit significant benefit.  The bottom of the foot and the ankle hard for the last few years.  She does complain of balance and coordination issues.  Does also complain of some generalized weakness and reports that she is not as strong as before    She also has some memory problems though that is not a high priority for the family to get addressed today.  Does complain of some depression as well.    2/10/22  And returns today  1.  Her facial droop is now completely resolved.  Reports no ongoing symptoms.  Reports no other strokelike symptoms.  No headaches.  2.  She continues to have burning pain in her feet.  Has tried gabapentin which she feels is helping  3.  Continues to complain of balance and coordination issues.    1/3/23  Patient returns  today and examined.    1.  Facial droop is completely resolved.  No other cranial nerve symptoms.  No drooling.  No headaches.  2.  Continues to have burning pain in the feet.  Pain is worse in the evening time.  During the daytime it is tolerable.  Gabapentin is helping but not completely.  No side effects.  3.  Does complain of pain in her right ankle.  This is a sharp pain that is worse with stretching the ankle.  Putting pressure on the ankle does help.  It is not worse at nighttime.  4.  Has not done the testing as requested previously.    Previous history is reviewed and this is unchanged.    PAST MEDICAL/SURGICAL HISTORY  History reviewed. No pertinent past medical history.  There is no problem list on file for this patient.  Significant for generalized weakness    FAMILY HISTORY  History reviewed. No pertinent family history.   No known neurological problems in the family.  Family history is not well known.    SOCIAL HISTORY  Social History     Tobacco Use     Smoking status: Never     Smokeless tobacco: Never   Substance Use Topics     Alcohol use: Not Currently     Drug use: Never       SYSTEMS REVIEW  Twelve-system ROS was done and positive for neck pain, numbness and tingling, weakness paralysis, difficulty walking, balance coordination problems, dizziness, hearing loss, memory problems, depression.  No new issues/concerns other than the HPI.    MEDICATIONS  ACCU-CHEK GUIDE test strip, TEST 1 TIME A DAY  aspirin (ASA) 81 MG chewable tablet, Take 1 tablet (81 mg) by mouth daily  gabapentin (NEURONTIN) 100 MG capsule, TAKE 1-2 PILLS BY MOUTH EVERY BEDTIME AS NEEDED FOR LEG TINGLING/YOG MOB CEG TXHUA HMO NOJ 1-2 LUB THAUM MUS PW  omeprazole (PRILOSEC) 20 MG DR capsule, TAKE 1 PILL BY MOUTH 30 MINUTES BEFORE MORNING MEAL/ TXHUA HNUB NOJ 1 LUB TSHUAJ 30 NASTHIV UA NTEJ NOJ TSHAIS  predniSONE (DELTASONE) 20 MG tablet, TAKE 3 PILLS BY MOUTH EVERY DAY FOR 7 DAYS/ TXHUA HNUB NOJ 3 LUB TSHUAJ BELTRAN 7 HNUB  SYSTANE  0.4-0.3 % SOLN ophthalmic solution, USE 1 DROP INTO BOTH EYES 3 TIMES DAILY/ JACKSON 1 NCOS BELTRAN TXHUA SAB QHOV MUAG 3 ZAUG IB HNUB *45 DAYS*    No current facility-administered medications on file prior to visit.       PHYSICAL EXAMINATION  VITALS: BP (!) 134/92   Pulse 56   Resp 16   GENERAL: Healthy appearing, alert, no acute distress, normal habitus.  CARDIOVASCULAR: Extremities warm and well perfused. Pulses present.   EYES: Funduscopic exam does not reveal any papilledema.   NEUROLOGICAL:  Patient is awake and oriented to self, place and time.  Attention span is normal.  Memory is grossly limited; unable to test due to language barrier.  Language is fluent and follows commands appropriately.  Appropriate fund of knowledge. Cranial nerves 2-12 are intact.  No facial droop noted today.  (Previously with right forehead and lower half of the face weakness.) There is no pronator drift.  Motor exam shows 5/5 strength in all extremities grossly.  Tone is symmetric bilaterally in upper and lower extremities.  (Previously reflexes are symmetric and absent in upper extremities and lower extremities. Sensory exam is grossly intact to light touch, pin prick and vibration.)  Finger to nose and heel to shin is without dysmetria.  Gait is wide-based.  Unable to do tandem.  Exam similar to before.  Unsteady wide-based gait.    DIAGNOSTICS  RELEVANT LABS  A1c 5.7  CBC noncontributory  Component      Latest Ref Rng & Units 7/16/2021   Sodium      136 - 145 mmol/L 139   Potassium      3.5 - 5.0 mmol/L 4.1   Chloride      98 - 107 mmol/L 106   Carbon Dioxide      22 - 31 mmol/L 19 (L)   Anion Gap      5 - 18 mmol/L 14   Urea Nitrogen      8 - 28 mg/dL 25   Creatinine      0.60 - 1.10 mg/dL 0.68   Calcium      8.5 - 10.5 mg/dL 9.2   Glucose      70 - 125 mg/dL 99   Alkaline Phosphatase      45 - 120 U/L 84   AST      0 - 40 U/L 19   ALT      0 - 45 U/L 19   Protein Total      6.0 - 8.0 g/dL 6.8   Albumin      3.5 - 5.0 g/dL 3.8    Bilirubin Total      0.0 - 1.0 mg/dL 0.5   GFR Estimate      >60 mL/min/1.73m2 80   TSH      0.30 - 5.00 uIU/mL 0.54   Vitamin B12      213 - 816 pg/mL 614   Magnesium      1.8 - 2.6 mg/dL 2.0   Treponema Antibodies      Negative Negative         OUTSIDE RECORDS  Outside referral notes and chart notes were reviewed and pertinent information has been summarized (in addition to the HPI):-Patient referred for burning feet, generalized weakness, dementia. Complains of burning feet for the last 4 to 5 years.  Has a boot on her foot which she wears for foot swelling.  Does not completely fit the foot.  Family is also decided but history of stroke versus Bell's palsy.  Has left hand tingling which is chronic wears a splint.  Tingling is worse at night.  Hand and feet.  There are some history of diabetes.  Is on duloxetine, gabapentin for paresthesias.    MRI-images reviewed.  Left frontal infarct noted.  IMPRESSION:  1.  No evidence of acute intracranial hemorrhage, mass effect, or infarction.  2.  Chronic lacunar within the deep white matter of the left frontal lobe.  3.  Mild nonspecific white matter changes.  4.  Mild brain parenchymal volume loss.    EMG  CLINICAL INTERPRETATION:  This is an abnormal nerve conduction and EMG study.  The study shows low normal amplitudes of multiple sensory and motor nerves suggestive of an early sensorimotor polyneuropathy.  Further clinical correlation is needed.     LABS  Component      Latest Ref Rng & Units 9/16/2021          12:50 PM   Albumin %      51.0 - 67.0 % 61.8   Albumin Fraction      3.2 - 4.7 g/dL 4.1   Alpha 1 %      2.0 - 4.0 % 2.2   Alpha 1 Fraction      0.1 - 0.3 g/dL 0.1   Alpha 2 %      5.0 - 13.0 % 9.8   Alpha 2 Fraction      0.4 - 0.9 g/dL 0.7   Beta %      10.0 - 17.0 % 11.2   Beta Fraction      0.7 - 1.2 g/dL 0.8   Gamma Globulin %      9.0 - 20.0 % 15.0   Gamma Fraction      0.6 - 1.4 g/dL 1.0   ELP Interpretation:       Normal serum protein electrophoresis.    Path ICD       R52   Interpreted By       Vitaliy Limon MD   Total Protein Serum for ELP      g/dL 6.7   Immunofixation ELP          Vitamin B1 Whole Blood Level      70 - 180 nmol/L 125   Total Protein Serum for ELP      6.0 - 8.0 g/dL 6.7     Component      Latest Ref Rng & Units 9/16/2021          12:50 PM   Albumin %      51.0 - 67.0 %    Albumin Fraction      3.2 - 4.7 g/dL    Alpha 1 %      2.0 - 4.0 %    Alpha 1 Fraction      0.1 - 0.3 g/dL    Alpha 2 %      5.0 - 13.0 %    Alpha 2 Fraction      0.4 - 0.9 g/dL    Beta %      10.0 - 17.0 %    Beta Fraction      0.7 - 1.2 g/dL    Gamma Globulin %      9.0 - 20.0 %    Gamma Fraction      0.6 - 1.4 g/dL    ELP Interpretation:          Path ICD       R52   Interpreted By       Vitaliy Limon MD   Total Protein Serum for ELP      g/dL    Immunofixation ELP       No monoclonal component identified.   Vitamin B1 Whole Blood Level      70 - 180 nmol/L    Total Protein Serum for ELP      6.0 - 8.0 g/dL          IMPRESSION/REPORT/PLAN  Facial droop -  [place] palsy-improved  Burning pain-suspect neuropathic pain  Stroke on MRI  Neuropathy  Right ankle pain      This is a 87 year old female with new onset of right facial droop.  Examination previously was suggestive of a lower motor neuron pattern.  MRI brain has come back negative for stroke that would fit with this facial droop.  Patient was treated with valacyclovir and prednisone.  She is currently asymptomatic as expected with a Bell's palsy.  Discussed prognosis with the patient and family.  Stable.    In terms of burning sensation in her feet.  Examination suggestive of neuropathy.  EMG is confirmatory for neuropathy blood work overall has been negative and I suspect she has idiopathic neuropathy.  For neuropathic pain gabapentin which is helpful but not completely.  We will increase the dose further today.  Discussed prognosis.    Right ankle pain is most likely unrelated to the neuropathy though the  higher dose of gabapentin might help.  She does have appointment with primary care later today for further evaluation.    Her MRI does show chronic infarct that does not fit with her symptoms.  I would complete the stroke work-up and will get an ultrasound of the carotid arteries, echocardiogram and Holter monitor.  We will further check for diabetes and lipid panel for hyperlipidemia.  We will start her on aspirin.  Encourage exercise and healthy lifestyle.  No new symptoms.  Work-up is currently pending.  Tolerating aspirin.    Return in 3 months.    -     aspirin (ASA) 81 MG chewable tablet; Take 1 tablet (81 mg) by mouth daily  -     gabapentin (NEURONTIN) 300 MG capsule; Take 1 cap in AM, 1 cap at noon and 2 cap at bedtime.  -     US Carotid Bilateral  -     Hemoglobin A1c; Future  -     Lipid panel reflex to direct LDL Fasting; Future  -     Echocardiogram Complete; Future  -     Holter Monitor 24 hour Adult Pediatric; Future      Return in about 3 months (around 4/3/2023) for In-Clinic Visit (must), After testing.    Over 35 minutes were spent coordinating the care for the patient on the day of the encounter.  This includes previsit, during visit and post visit activities as documented above.  Counseling patient.  Reordering testing.  Prescription management.  Refractory problems.  Multiple problems reviewed.  New problem.  (Activities include but not inclusive of reviewing chart, reviewing outside records, reviewing labs and imaging study results as well as the images, patient visit time including getting history and exam,  use if applicable, review of test results with the patient and coming up with a plan in a shared model, counseling patient and family, education and answering patient questions, EMR , EMR diagnosis entry and problem list management, medication reconciliation and prescription management if applicable, paperwork if applicable, printing documents and documentation of  the visit activities.)      Jos Rubio MD  Neurologist  [facility]  Tel:- [phone]    This note was dictated using voice recognition software.  Any grammatical or context distortions are unintentional and inherent to the software.        Again, thank you for allowing me to participate in the care of your patient.        Sincerely,        Jos Rubio MD

## 2023-01-03 NOTE — PROGRESS NOTES
NEUROLOGY OUTPATIENT PROGRESS NOTE   Mick 3, 2023     CHIEF COMPLAINT/REASON FOR VISIT/REASON FOR CONSULT  Patient presents with:  Follow Up    REASON FOR CONSULTATION- Facial droop    REFERRAL SOURCE  Dr. Sugar Weaver   Dr. Sugar Weaver    HISTORY OF PRESENT ILLNESS  Chuckie Burns is a 87 year old female seen today for evaluation of facial droop.  Patient reports that the symptoms came on gradually 11 days ago.  They have been persisting.  She was seen by her primary care doctor and was put on valacyclovir and prednisone which have not made any significant difference.  She has been on these medications for about a week.  The facial droop is on the right side.  Denies any headaches.  Eyes will not bring completely.  Reports no history of hypertension or diabetes.  Does complain of some neck pain.    In addition to the facial droop she also complains of burning feet.  This is more chronic.  She has been put on gabapentin with some benefit significant benefit.  The bottom of the foot and the ankle hard for the last few years.  She does complain of balance and coordination issues.  Does also complain of some generalized weakness and reports that she is not as strong as before    She also has some memory problems though that is not a high priority for the family to get addressed today.  Does complain of some depression as well.    2/10/22  And returns today  1.  Her facial droop is now completely resolved.  Reports no ongoing symptoms.  Reports no other strokelike symptoms.  No headaches.  2.  She continues to have burning pain in her feet.  Has tried gabapentin which she feels is helping  3.  Continues to complain of balance and coordination issues.    1/3/23  Patient returns today and examined.    1.  Facial droop is completely resolved.  No other cranial nerve symptoms.  No drooling.  No headaches.  2.  Continues to have burning pain in the feet.  Pain is worse in the evening time.  During the daytime it is  tolerable.  Gabapentin is helping but not completely.  No side effects.  3.  Does complain of pain in her right ankle.  This is a sharp pain that is worse with stretching the ankle.  Putting pressure on the ankle does help.  It is not worse at nighttime.  4.  Has not done the testing as requested previously.    Previous history is reviewed and this is unchanged.    PAST MEDICAL/SURGICAL HISTORY  History reviewed. No pertinent past medical history.  There is no problem list on file for this patient.  Significant for generalized weakness    FAMILY HISTORY  History reviewed. No pertinent family history.   No known neurological problems in the family.  Family history is not well known.    SOCIAL HISTORY  Social History     Tobacco Use     Smoking status: Never     Smokeless tobacco: Never   Substance Use Topics     Alcohol use: Not Currently     Drug use: Never       SYSTEMS REVIEW  Twelve-system ROS was done and positive for neck pain, numbness and tingling, weakness paralysis, difficulty walking, balance coordination problems, dizziness, hearing loss, memory problems, depression.  No new issues/concerns other than the HPI.    MEDICATIONS  ACCU-CHEK GUIDE test strip, TEST 1 TIME A DAY  aspirin (ASA) 81 MG chewable tablet, Take 1 tablet (81 mg) by mouth daily  gabapentin (NEURONTIN) 100 MG capsule, TAKE 1-2 PILLS BY MOUTH EVERY BEDTIME AS NEEDED FOR LEG TINGLING/YOG MOB CEG TXHUA HMO NOJ 1-2 LUB THAUM MUS PW  omeprazole (PRILOSEC) 20 MG DR capsule, TAKE 1 PILL BY MOUTH 30 MINUTES BEFORE MORNING MEAL/ TXHUA HNUB NOJ 1 LUB TSHUAJ 30 NASTHIV UA NTEJ NOJ TSHAIS  predniSONE (DELTASONE) 20 MG tablet, TAKE 3 PILLS BY MOUTH EVERY DAY FOR 7 DAYS/ TXHUA HNUB NOJ 3 LUB TSHUAJ BELTRAN 7 HNUB  SYSTANE 0.4-0.3 % SOLN ophthalmic solution, USE 1 DROP INTO BOTH EYES 3 TIMES DAILY/  1 NCOS BELTRAN TXHUA SAB QHOV MUAG 3 ZAUG IB HNUB *45 DAYS*    No current facility-administered medications on file prior to visit.       PHYSICAL  EXAMINATION  VITALS: BP (!) 134/92   Pulse 56   Resp 16   GENERAL: Healthy appearing, alert, no acute distress, normal habitus.  CARDIOVASCULAR: Extremities warm and well perfused. Pulses present.   EYES: Funduscopic exam does not reveal any papilledema.   NEUROLOGICAL:  Patient is awake and oriented to self, place and time.  Attention span is normal.  Memory is grossly limited; unable to test due to language barrier.  Language is fluent and follows commands appropriately.  Appropriate fund of knowledge. Cranial nerves 2-12 are intact.  No facial droop noted today.  (Previously with right forehead and lower half of the face weakness.) There is no pronator drift.  Motor exam shows 5/5 strength in all extremities grossly.  Tone is symmetric bilaterally in upper and lower extremities.  (Previously reflexes are symmetric and absent in upper extremities and lower extremities. Sensory exam is grossly intact to light touch, pin prick and vibration.)  Finger to nose and heel to shin is without dysmetria.  Gait is wide-based.  Unable to do tandem.  Exam similar to before.  Unsteady wide-based gait.    DIAGNOSTICS  RELEVANT LABS  A1c 5.7  CBC noncontributory  Component      Latest Ref Rng & Units 7/16/2021   Sodium      136 - 145 mmol/L 139   Potassium      3.5 - 5.0 mmol/L 4.1   Chloride      98 - 107 mmol/L 106   Carbon Dioxide      22 - 31 mmol/L 19 (L)   Anion Gap      5 - 18 mmol/L 14   Urea Nitrogen      8 - 28 mg/dL 25   Creatinine      0.60 - 1.10 mg/dL 0.68   Calcium      8.5 - 10.5 mg/dL 9.2   Glucose      70 - 125 mg/dL 99   Alkaline Phosphatase      45 - 120 U/L 84   AST      0 - 40 U/L 19   ALT      0 - 45 U/L 19   Protein Total      6.0 - 8.0 g/dL 6.8   Albumin      3.5 - 5.0 g/dL 3.8   Bilirubin Total      0.0 - 1.0 mg/dL 0.5   GFR Estimate      >60 mL/min/1.73m2 80   TSH      0.30 - 5.00 uIU/mL 0.54   Vitamin B12      213 - 816 pg/mL 614   Magnesium      1.8 - 2.6 mg/dL 2.0   Treponema Antibodies       Negative Negative         OUTSIDE RECORDS  Outside referral notes and chart notes were reviewed and pertinent information has been summarized (in addition to the HPI):-Patient referred for burning feet, generalized weakness, dementia. Complains of burning feet for the last 4 to 5 years.  Has a boot on her foot which she wears for foot swelling.  Does not completely fit the foot.  Family is also decided but history of stroke versus Bell's palsy.  Has left hand tingling which is chronic wears a splint.  Tingling is worse at night.  Hand and feet.  There are some history of diabetes.  Is on duloxetine, gabapentin for paresthesias.    MRI-images reviewed.  Left frontal infarct noted.  IMPRESSION:  1.  No evidence of acute intracranial hemorrhage, mass effect, or infarction.  2.  Chronic lacunar within the deep white matter of the left frontal lobe.  3.  Mild nonspecific white matter changes.  4.  Mild brain parenchymal volume loss.    EMG  CLINICAL INTERPRETATION:  This is an abnormal nerve conduction and EMG study.  The study shows low normal amplitudes of multiple sensory and motor nerves suggestive of an early sensorimotor polyneuropathy.  Further clinical correlation is needed.     LABS  Component      Latest Ref Rng & Units 9/16/2021          12:50 PM   Albumin %      51.0 - 67.0 % 61.8   Albumin Fraction      3.2 - 4.7 g/dL 4.1   Alpha 1 %      2.0 - 4.0 % 2.2   Alpha 1 Fraction      0.1 - 0.3 g/dL 0.1   Alpha 2 %      5.0 - 13.0 % 9.8   Alpha 2 Fraction      0.4 - 0.9 g/dL 0.7   Beta %      10.0 - 17.0 % 11.2   Beta Fraction      0.7 - 1.2 g/dL 0.8   Gamma Globulin %      9.0 - 20.0 % 15.0   Gamma Fraction      0.6 - 1.4 g/dL 1.0   ELP Interpretation:       Normal serum protein electrophoresis.   Path ICD       R52   Interpreted By       Vitaliy Limon MD   Total Protein Serum for ELP      g/dL 6.7   Immunofixation ELP          Vitamin B1 Whole Blood Level      70 - 180 nmol/L 125   Total Protein Serum for  ELP      6.0 - 8.0 g/dL 6.7     Component      Latest Ref Rng & Units 9/16/2021          12:50 PM   Albumin %      51.0 - 67.0 %    Albumin Fraction      3.2 - 4.7 g/dL    Alpha 1 %      2.0 - 4.0 %    Alpha 1 Fraction      0.1 - 0.3 g/dL    Alpha 2 %      5.0 - 13.0 %    Alpha 2 Fraction      0.4 - 0.9 g/dL    Beta %      10.0 - 17.0 %    Beta Fraction      0.7 - 1.2 g/dL    Gamma Globulin %      9.0 - 20.0 %    Gamma Fraction      0.6 - 1.4 g/dL    ELP Interpretation:          Path ICD       R52   Interpreted By       Vitaliy Limon MD   Total Protein Serum for ELP      g/dL    Immunofixation ELP       No monoclonal component identified.   Vitamin B1 Whole Blood Level      70 - 180 nmol/L    Total Protein Serum for ELP      6.0 - 8.0 g/dL          IMPRESSION/REPORT/PLAN  Facial droop -  [place] palsy-improved  Burning pain-suspect neuropathic pain  Stroke on MRI  Neuropathy  Right ankle pain      This is a 87 year old female with new onset of right facial droop.  Examination previously was suggestive of a lower motor neuron pattern.  MRI brain has come back negative for stroke that would fit with this facial droop.  Patient was treated with valacyclovir and prednisone.  She is currently asymptomatic as expected with a Bell's palsy.  Discussed prognosis with the patient and family.  Stable.    In terms of burning sensation in her feet.  Examination suggestive of neuropathy.  EMG is confirmatory for neuropathy blood work overall has been negative and I suspect she has idiopathic neuropathy.  For neuropathic pain gabapentin which is helpful but not completely.  We will increase the dose further today.  Discussed prognosis.    Right ankle pain is most likely unrelated to the neuropathy though the higher dose of gabapentin might help.  She does have appointment with primary care later today for further evaluation.    Her MRI does show chronic infarct that does not fit with her symptoms.  I would complete the stroke  work-up and will get an ultrasound of the carotid arteries, echocardiogram and Holter monitor.  We will further check for diabetes and lipid panel for hyperlipidemia.  We will start her on aspirin.  Encourage exercise and healthy lifestyle.  No new symptoms.  Work-up is currently pending.  Tolerating aspirin.    Return in 3 months.    -     aspirin (ASA) 81 MG chewable tablet; Take 1 tablet (81 mg) by mouth daily  -     gabapentin (NEURONTIN) 300 MG capsule; Take 1 cap in AM, 1 cap at noon and 2 cap at bedtime.  -     US Carotid Bilateral  -     Hemoglobin A1c; Future  -     Lipid panel reflex to direct LDL Fasting; Future  -     Echocardiogram Complete; Future  -     Holter Monitor 24 hour Adult Pediatric; Future      Return in about 3 months (around 4/3/2023) for In-Clinic Visit (must), After testing.    Over 35 minutes were spent coordinating the care for the patient on the day of the encounter.  This includes previsit, during visit and post visit activities as documented above.  Counseling patient.  Reordering testing.  Prescription management.  Refractory problems.  Multiple problems reviewed.  New problem.  (Activities include but not inclusive of reviewing chart, reviewing outside records, reviewing labs and imaging study results as well as the images, patient visit time including getting history and exam,  use if applicable, review of test results with the patient and coming up with a plan in a shared model, counseling patient and family, education and answering patient questions, EMR , EMR diagnosis entry and problem list management, medication reconciliation and prescription management if applicable, paperwork if applicable, printing documents and documentation of the visit activities.)      Jos Rubio MD  Neurologist  [facility]  Tel:- [phone]    This note was dictated using voice recognition software.  Any grammatical or context  distortions are unintentional and inherent to the software.

## 2023-01-05 ENCOUNTER — HOSPITAL ENCOUNTER (OUTPATIENT)
Dept: CARDIOLOGY | Facility: CLINIC | Age: 88
Discharge: HOME | End: 2023-01-05
Attending: PSYCHIATRY & NEUROLOGY
Payer: COMMERCIAL

## 2023-01-05 DIAGNOSIS — I63.9 CEREBROVASCULAR ACCIDENT (CVA), UNSPECIFIED MECHANISM (H): ICD-10-CM

## 2023-01-05 LAB — LVEF ECHO: NORMAL

## 2023-01-05 PROCEDURE — 93226 XTRNL ECG REC<48 HR SCAN A/R: CPT

## 2023-01-05 PROCEDURE — 999N000208 ECHOCARDIOGRAM COMPLETE

## 2023-01-05 PROCEDURE — 93306 TTE W/DOPPLER COMPLETE: CPT | Mod: 26 | Performed by: INTERNAL MEDICINE

## 2023-01-06 ENCOUNTER — LAB (OUTPATIENT)
Dept: LAB | Facility: HOSPITAL | Age: 88
End: 2023-01-06
Attending: PSYCHIATRY & NEUROLOGY
Payer: COMMERCIAL

## 2023-01-06 ENCOUNTER — HOSPITAL ENCOUNTER (OUTPATIENT)
Dept: ULTRASOUND IMAGING | Facility: HOSPITAL | Age: 88
Discharge: HOME | End: 2023-01-06
Attending: PSYCHIATRY & NEUROLOGY
Payer: COMMERCIAL

## 2023-01-06 DIAGNOSIS — I63.9 CEREBROVASCULAR ACCIDENT (CVA), UNSPECIFIED MECHANISM (H): ICD-10-CM

## 2023-01-06 DIAGNOSIS — R73.9 HYPERGLYCEMIA: ICD-10-CM

## 2023-01-06 LAB
CHOLEST SERPL-MCNC: 182 MG/DL
HBA1C MFR BLD: 5.6 %
HDLC SERPL-MCNC: 33 MG/DL
LDLC SERPL CALC-MCNC: 77 MG/DL
NONHDLC SERPL-MCNC: 149 MG/DL
TRIGL SERPL-MCNC: 358 MG/DL

## 2023-01-06 PROCEDURE — 83036 HEMOGLOBIN GLYCOSYLATED A1C: CPT

## 2023-01-06 PROCEDURE — 93880 EXTRACRANIAL BILAT STUDY: CPT

## 2023-01-06 PROCEDURE — 80061 LIPID PANEL: CPT

## 2023-01-06 PROCEDURE — 36415 COLL VENOUS BLD VENIPUNCTURE: CPT

## 2023-01-09 PROCEDURE — 93227 XTRNL ECG REC<48 HR R&I: CPT | Performed by: INTERNAL MEDICINE

## 2023-02-13 ENCOUNTER — LAB REQUISITION (OUTPATIENT)
Dept: LAB | Facility: CLINIC | Age: 88
End: 2023-02-13

## 2023-02-13 DIAGNOSIS — Z13.220 ENCOUNTER FOR SCREENING FOR LIPOID DISORDERS: ICD-10-CM

## 2023-02-13 LAB
CHOLEST SERPL-MCNC: 213 MG/DL
HDLC SERPL-MCNC: 42 MG/DL
LDLC SERPL CALC-MCNC: 148 MG/DL
NONHDLC SERPL-MCNC: 171 MG/DL
TRIGL SERPL-MCNC: 116 MG/DL

## 2023-02-13 PROCEDURE — 80061 LIPID PANEL: CPT | Performed by: PHYSICIAN ASSISTANT

## 2023-05-09 ENCOUNTER — LAB REQUISITION (OUTPATIENT)
Dept: LAB | Facility: CLINIC | Age: 88
End: 2023-05-09

## 2023-05-09 DIAGNOSIS — E11.42 TYPE 2 DIABETES MELLITUS WITH DIABETIC POLYNEUROPATHY (H): ICD-10-CM

## 2023-05-09 LAB
ALBUMIN SERPL BCG-MCNC: 4.3 G/DL (ref 3.5–5.2)
ALP SERPL-CCNC: 79 U/L (ref 35–104)
ALT SERPL W P-5'-P-CCNC: 22 U/L (ref 10–35)
ANION GAP SERPL CALCULATED.3IONS-SCNC: 12 MMOL/L (ref 7–15)
AST SERPL W P-5'-P-CCNC: 24 U/L (ref 10–35)
BILIRUB SERPL-MCNC: 0.5 MG/DL
BUN SERPL-MCNC: 19.9 MG/DL (ref 8–23)
CALCIUM SERPL-MCNC: 9.3 MG/DL (ref 8.8–10.2)
CHLORIDE SERPL-SCNC: 103 MMOL/L (ref 98–107)
CREAT SERPL-MCNC: 0.74 MG/DL (ref 0.51–0.95)
DEPRECATED HCO3 PLAS-SCNC: 26 MMOL/L (ref 22–29)
GFR SERPL CREATININE-BSD FRML MDRD: 78 ML/MIN/1.73M2
GLUCOSE SERPL-MCNC: 116 MG/DL (ref 70–99)
POTASSIUM SERPL-SCNC: 4.4 MMOL/L (ref 3.4–5.3)
PROT SERPL-MCNC: 6.8 G/DL (ref 6.4–8.3)
SODIUM SERPL-SCNC: 141 MMOL/L (ref 136–145)

## 2023-05-09 PROCEDURE — 82570 ASSAY OF URINE CREATININE: CPT | Performed by: PHYSICIAN ASSISTANT

## 2023-05-09 PROCEDURE — 80053 COMPREHEN METABOLIC PANEL: CPT | Performed by: PHYSICIAN ASSISTANT

## 2023-05-10 LAB
CREAT UR-MCNC: 85.1 MG/DL
MICROALBUMIN UR-MCNC: 13.7 MG/L
MICROALBUMIN/CREAT UR: 16.1 MG/G CR (ref 0–25)

## 2023-07-10 ENCOUNTER — OFFICE VISIT (OUTPATIENT)
Dept: NEUROLOGY | Facility: CLINIC | Age: 88
End: 2023-07-10
Payer: COMMERCIAL

## 2023-07-10 VITALS — RESPIRATION RATE: 16 BRPM | SYSTOLIC BLOOD PRESSURE: 140 MMHG | DIASTOLIC BLOOD PRESSURE: 94 MMHG | HEART RATE: 60 BPM

## 2023-07-10 DIAGNOSIS — E78.5 HYPERLIPIDEMIA LDL GOAL <70: ICD-10-CM

## 2023-07-10 DIAGNOSIS — G62.9 NEUROPATHY: ICD-10-CM

## 2023-07-10 DIAGNOSIS — I63.9 CEREBROVASCULAR ACCIDENT (CVA), UNSPECIFIED MECHANISM (H): ICD-10-CM

## 2023-07-10 DIAGNOSIS — R52 BURNING PAIN: Primary | ICD-10-CM

## 2023-07-10 PROCEDURE — 99215 OFFICE O/P EST HI 40 MIN: CPT | Performed by: PSYCHIATRY & NEUROLOGY

## 2023-07-10 RX ORDER — GABAPENTIN 300 MG/1
CAPSULE ORAL
Qty: 360 CAPSULE | Refills: 1 | Status: SHIPPED | OUTPATIENT
Start: 2023-07-10 | End: 2024-07-22

## 2023-07-10 RX ORDER — ASPIRIN 81 MG/1
81 TABLET, CHEWABLE ORAL DAILY
Qty: 90 TABLET | Refills: 1 | Status: SHIPPED | OUTPATIENT
Start: 2023-07-10

## 2023-07-10 NOTE — PROGRESS NOTES
NEUROLOGY OUTPATIENT PROGRESS NOTE   Jul 10, 2023     CHIEF COMPLAINT/REASON FOR VISIT/REASON FOR CONSULT  Patient presents with:  Follow Up    REASON FOR CONSULTATION- Facial droop    REFERRAL SOURCE  Dr. Sugar Weaver   Dr. Sugar Weaver    HISTORY OF PRESENT ILLNESS  Chuckie Burns is a 88 year old female seen today for evaluation of facial droop.  Patient reports that the symptoms came on gradually 11 days ago.  They have been persisting.  She was seen by her primary care doctor and was put on valacyclovir and prednisone which have not made any significant difference.  She has been on these medications for about a week.  The facial droop is on the right side.  Denies any headaches.  Eyes will not bring completely.  Reports no history of hypertension or diabetes.  Does complain of some neck pain.    In addition to the facial droop she also complains of burning feet.  This is more chronic.  She has been put on gabapentin with some benefit significant benefit.  The bottom of the foot and the ankle hard for the last few years.  She does complain of balance and coordination issues.  Does also complain of some generalized weakness and reports that she is not as strong as before    She also has some memory problems though that is not a high priority for the family to get addressed today.  Does complain of some depression as well.    2/10/22  And returns today  1.  Her facial droop is now completely resolved.  Reports no ongoing symptoms.  Reports no other strokelike symptoms.  No headaches.  2.  She continues to have burning pain in her feet.  Has tried gabapentin which she feels is helping  3.  Continues to complain of balance and coordination issues.    1/3/23  Patient returns today and examined.    1.  Facial droop is completely resolved.  No other cranial nerve symptoms.  No drooling.  No headaches.  2.  Continues to have burning pain in the feet.  Pain is worse in the evening time.  During the daytime it is  tolerable.  Gabapentin is helping but not completely.  No side effects.  3.  Does complain of pain in her right ankle.  This is a sharp pain that is worse with stretching the ankle.  Putting pressure on the ankle does help.  It is not worse at nighttime.  4.  Has not done the testing as requested previously.    7/10/23  Patient returns today  1.  Facial droop is completely resolved.  No headaches or other new strokelike symptoms.  2.  LDL was slightly elevated and family wants to manage through diet.  3.  Continues to have burning pain in the feet though is using miracle rub which is helping.  Medication also helps.  Wants to stay on the same dose for right now.  No side effects  4.  Continues to have the ankle issues.  Has not seen the primary care doctor.  Encouraged him to see the primary care doctor again.    Previous history is reviewed and this is unchanged.    PAST MEDICAL/SURGICAL HISTORY  History reviewed. No pertinent past medical history.  There is no problem list on file for this patient.  Significant for generalized weakness    FAMILY HISTORY  History reviewed. No pertinent family history.   No known neurological problems in the family.  Family history is not well known.    SOCIAL HISTORY  Social History     Tobacco Use     Smoking status: Never     Smokeless tobacco: Never   Substance Use Topics     Alcohol use: Not Currently     Drug use: Never       SYSTEMS REVIEW  Twelve-system ROS was done and positive for neck pain, numbness and tingling, weakness paralysis, difficulty walking, balance coordination problems, dizziness, hearing loss, memory problems, depression.  No new symptoms/issues.    MEDICATIONS  ACCU-CHEK GUIDE test strip, TEST 1 TIME A DAY  gabapentin (NEURONTIN) 100 MG capsule, TAKE 1-2 PILLS BY MOUTH EVERY BEDTIME AS NEEDED FOR LEG TINGLING/YOG MOB CEG TXHUA O NOJ 1-2 LUB THAUM MUS PW  omeprazole (PRILOSEC) 20 MG DR capsule, TAKE 1 PILL BY MOUTH 30 MINUTES BEFORE MORNING MEAL/ TXHUA  HNUB NOJ 1 LUB TSHUAJ 30 NASTHIV UA NTEJ NOJ TSHAIS  predniSONE (DELTASONE) 20 MG tablet, TAKE 3 PILLS BY MOUTH EVERY DAY FOR 7 DAYS/ TXHUA HNUB NOJ 3 LUB TSHUAJ BELTRAN 7 HNUB  SYSTANE 0.4-0.3 % SOLN ophthalmic solution, USE 1 DROP INTO BOTH EYES 3 TIMES DAILY/ [place] 1 NCOS BELTRAN TXHUA SAB QHOV MUAG 3 ZAUG IB HNUB *45 DAYS*    No current facility-administered medications on file prior to visit.       PHYSICAL EXAMINATION  VITALS: BP (!) 140/94   Pulse 60   Resp 16   GENERAL: Healthy appearing, alert, no acute distress, normal habitus.  CARDIOVASCULAR: Extremities warm and well perfused. Pulses present.   EYES: Funduscopic exam does not reveal any papilledema.   NEUROLOGICAL:  Patient is awake and oriented to self, place and time.  Attention span is normal.  Memory is grossly limited; unable to test due to language barrier.  Language is fluent and follows commands appropriately.  Appropriate fund of knowledge. Cranial nerves 2-12 are intact.  No facial droop noted today.  (Previously with right forehead and lower half of the face weakness.) There is no pronator drift.  Motor exam shows 5/5 strength in all extremities grossly.  Tone is symmetric bilaterally in upper and lower extremities.  (Previously reflexes are symmetric and absent in upper extremities and lower extremities. Sensory exam is grossly intact to light touch, pin prick and vibration.)  Finger to nose and heel to shin is without dysmetria.  Gait is wide-based.  Unable to do tandem.  Exam stable compared to before.  Gait not possible today.    DIAGNOSTICS  RELEVANT LABS  A1c 5.7  CBC noncontributory  Component      Latest Ref Rng & Units 7/16/2021   Sodium      136 - 145 mmol/L 139   Potassium      3.5 - 5.0 mmol/L 4.1   Chloride      98 - 107 mmol/L 106   Carbon Dioxide      22 - 31 mmol/L 19 (L)   Anion Gap      5 - 18 mmol/L 14   Urea Nitrogen      8 - 28 mg/dL 25   Creatinine      0.60 - 1.10 mg/dL 0.68   Calcium      8.5 - 10.5 mg/dL 9.2   Glucose      70  - 125 mg/dL 99   Alkaline Phosphatase      45 - 120 U/L 84   AST      0 - 40 U/L 19   ALT      0 - 45 U/L 19   Protein Total      6.0 - 8.0 g/dL 6.8   Albumin      3.5 - 5.0 g/dL 3.8   Bilirubin Total      0.0 - 1.0 mg/dL 0.5   GFR Estimate      >60 mL/min/1.73m2 80   TSH      0.30 - 5.00 uIU/mL 0.54   Vitamin B12      213 - 816 pg/mL 614   Magnesium      1.8 - 2.6 mg/dL 2.0   Treponema Antibodies      Negative Negative         OUTSIDE RECORDS  Outside referral notes and chart notes were reviewed and pertinent information has been summarized (in addition to the HPI):-Patient referred for burning feet, generalized weakness, dementia. Complains of burning feet for the last 4 to 5 years.  Has a boot on her foot which she wears for foot swelling.  Does not completely fit the foot.  Family is also decided but history of stroke versus Bell's palsy.  Has left hand tingling which is chronic wears a splint.  Tingling is worse at night.  Hand and feet.  There are some history of diabetes.  Is on duloxetine, gabapentin for paresthesias.    MRI-images reviewed.  Left frontal infarct noted.  IMPRESSION:  1.  No evidence of acute intracranial hemorrhage, mass effect, or infarction.  2.  Chronic lacunar within the deep white matter of the left frontal lobe.  3.  Mild nonspecific white matter changes.  4.  Mild brain parenchymal volume loss.    EMG  CLINICAL INTERPRETATION:  This is an abnormal nerve conduction and EMG study.  The study shows low normal amplitudes of multiple sensory and motor nerves suggestive of an early sensorimotor polyneuropathy.  Further clinical correlation is needed.     LABS  Component      Latest Ref Rng & Units 9/16/2021          12:50 PM   Albumin %      51.0 - 67.0 % 61.8   Albumin Fraction      3.2 - 4.7 g/dL 4.1   Alpha 1 %      2.0 - 4.0 % 2.2   Alpha 1 Fraction      0.1 - 0.3 g/dL 0.1   Alpha 2 %      5.0 - 13.0 % 9.8   Alpha 2 Fraction      0.4 - 0.9 g/dL 0.7   Beta %      10.0 - 17.0 % 11.2    Beta Fraction      0.7 - 1.2 g/dL 0.8   Gamma Globulin %      9.0 - 20.0 % 15.0   Gamma Fraction      0.6 - 1.4 g/dL 1.0   ELP Interpretation:       Normal serum protein electrophoresis.   Path ICD       R52   Interpreted By       Vitaliy Limon MD   Total Protein Serum for ELP      g/dL 6.7   Immunofixation ELP          Vitamin B1 Whole Blood Level      70 - 180 nmol/L 125   Total Protein Serum for ELP      6.0 - 8.0 g/dL 6.7     Component      Latest Ref Rng & Units 9/16/2021          12:50 PM   Albumin %      51.0 - 67.0 %    Albumin Fraction      3.2 - 4.7 g/dL    Alpha 1 %      2.0 - 4.0 %    Alpha 1 Fraction      0.1 - 0.3 g/dL    Alpha 2 %      5.0 - 13.0 %    Alpha 2 Fraction      0.4 - 0.9 g/dL    Beta %      10.0 - 17.0 %    Beta Fraction      0.7 - 1.2 g/dL    Gamma Globulin %      9.0 - 20.0 %    Gamma Fraction      0.6 - 1.4 g/dL    ELP Interpretation:          Path ICD       R52   Interpreted By       Vitaliy Limon MD   Total Protein Serum for ELP      g/dL    Immunofixation ELP       No monoclonal component identified.   Vitamin B1 Whole Blood Level      70 - 180 nmol/L    Total Protein Serum for ELP      6.0 - 8.0 g/dL      LABS  Component      Latest Ref Rng 1/6/2023  10:22 AM   Cholesterol      <200 mg/dL 182    Triglycerides      <150 mg/dL 358 (H)    HDL Cholesterol      >=50 mg/dL 33 (L)    LDL Cholesterol Calculated      <=100 mg/dL 77    Non HDL Cholesterol      <130 mg/dL 149 (H)    Hemoglobin A1C      <5.7 % 5.6       Legend:  (H) High  (L) Low    Holter    ECHO  1.Left ventricular size, wall motion and function are normal. The ejection  fraction is 60-65%.  2.Normal right ventricle size and systolic function.  3.A contrast injection (Bubble Study) was performed that was negative for flow  across the interatrial septum.  4.No hemodynamically significant valvular abnormalities on 2D or color flow  imaging.  There is no comparison study available.    Carotid US  IMPRESSION:  1.   Mild plaque formation, velocities consistent with less than 50% stenosis in the right internal carotid artery.  2.  Mild plaque formation, velocities consistent with less than 50% stenosis in the left internal carotid artery.  3.  Flow within the vertebral arteries is antegrade.      IMPRESSION/REPORT/PLAN  Facial droop -  [place] palsy-improved  Burning pain-suspect neuropathic pain  Stroke on MRI  Neuropathy  Right ankle pain  Hyperlipidemia LDL goal less than 70    This is a 88 year old female with new onset of right facial droop.  Examination previously was suggestive of a lower motor neuron pattern.  MRI brain has come back negative for stroke that would fit with this facial droop.  Patient was treated with valacyclovir and prednisone.  She is currently asymptomatic as expected with a Bell's palsy.  Discussed prognosis with the patient and family.  No change.    In terms of burning sensation in her feet.  Examination suggestive of neuropathy.  EMG is confirmatory for neuropathy blood work overall has been negative and I suspect she has idiopathic neuropathy.  For neuropathic pain gabapentin which is helpful but not completely.  Also using miracle drug.  We will continue same dose of gabapentin for right now.  Discussed prognosis.    Right ankle pain is most likely unrelated to the neuropathy though the higher dose of gabapentin might help.  She does have appointment with primary care later today for further evaluation.  Reemphasized this.    Her MRI does show chronic infarct that does not fit with her symptoms.  Carotid ultrasound was negative for significant stenosis.  Echocardiogram was negative for PFO/thrombus.  Holter monitor did not show any atrial fibrillation.  Testing for diabetes was negative.  LDL is slightly elevated at 77.  Goal would be less than 70.  Family does not want to start a statin right now.  Continue aspirin.  Recommend exercise and healthy lifestyle.  Further management of vascular risk  factors to primary care.    Return back in 1 year or as needed for neuropathic pain.    -     gabapentin (NEURONTIN) 300 MG capsule; Take 1 cap in AM, 1 cap at noon and 2 cap at bedtime.  -     aspirin (ASA) 81 MG chewable tablet; Take 1 tablet (81 mg) by mouth daily  -     UNABLE TO FIND; MEDICATION NAME: Miracle of Aloe - Miracle Rub cream  Use cream on sore muscles every evening    Return in about 1 year (around 7/10/2024), or if symptoms worsen or fail to improve, for In-Clinic Visit (must).    Over 40 minutes were spent coordinating the care for the patient on the day of the encounter.  This includes previsit, during visit and post visit activities as documented above.  Counseling patient and family.  Use of  requires extra time.  Multiple test reviewed.  Multiple problems reviewed/addressed.  Prescription management.  (Activities include but not inclusive of reviewing chart, reviewing outside records, reviewing labs and imaging study results as well as the images, patient visit time including getting history and exam,  use if applicable, review of test results with the patient and coming up with a plan in a shared model, counseling patient and family, education and answering patient questions, EMR , EMR diagnosis entry and problem list management, medication reconciliation and prescription management if applicable, paperwork if applicable, printing documents and documentation of the visit activities.)      Jos Rubio MD  Neurologist  [facility]  Tel:- [phone]    This note was dictated using voice recognition software.  Any grammatical or context distortions are unintentional and inherent to the software.

## 2023-07-10 NOTE — LETTER
7/10/2023         RE: Chuckie Burns  [address]  [facility] [address]        Dear Colleague,    Thank you for referring your patient, Chuckie Burns, to the [facility] [place]. Please see a copy of my visit note below.    mNEUROLOGY OUTPATIENT PROGRESS NOTE   Jul 10, 2023     CHIEF COMPLAINT/REASON FOR VISIT/REASON FOR CONSULT  Patient presents with:  Follow Up    REASON FOR CONSULTATION- Facial droop    REFERRAL SOURCE  Dr. Sugar Weaver  CC Dr. Sugar Weaver    HISTORY OF PRESENT ILLNESS  Chuckie Burns is a 88 year old female seen today for evaluation of facial droop.  Patient reports that the symptoms came on gradually 11 days ago.  They have been persisting.  She was seen by her primary care doctor and was put on valacyclovir and prednisone which have not made any significant difference.  She has been on these medications for about a week.  The facial droop is on the right side.  Denies any headaches.  Eyes will not bring completely.  Reports no history of hypertension or diabetes.  Does complain of some neck pain.    In addition to the facial droop she also complains of burning feet.  This is more chronic.  She has been put on gabapentin with some benefit significant benefit.  The bottom of the foot and the ankle hard for the last few years.  She does complain of balance and coordination issues.  Does also complain of some generalized weakness and reports that she is not as strong as before    She also has some memory problems though that is not a high priority for the family to get addressed today.  Does complain of some depression as well.    2/10/22  And returns today  1.  Her facial droop is now completely resolved.  Reports no ongoing symptoms.  Reports no other strokelike symptoms.  No headaches.  2.  She continues to have burning pain in her feet.  Has tried gabapentin which she feels is helping  3.  Continues to complain of balance and coordination issues.    1/3/23  Patient  returns today and examined.    1.  Facial droop is completely resolved.  No other cranial nerve symptoms.  No drooling.  No headaches.  2.  Continues to have burning pain in the feet.  Pain is worse in the evening time.  During the daytime it is tolerable.  Gabapentin is helping but not completely.  No side effects.  3.  Does complain of pain in her right ankle.  This is a sharp pain that is worse with stretching the ankle.  Putting pressure on the ankle does help.  It is not worse at nighttime.  4.  Has not done the testing as requested previously.    7/10/23  Patient returns today  1.  Facial droop is completely resolved.  No headaches or other new strokelike symptoms.  2.  LDL was slightly elevated and family wants to manage through diet.  3.  Continues to have burning pain in the feet though is using miracle rub which is helping.  Medication also helps.  Wants to stay on the same dose for right now.  No side effects  4.  Continues to have the ankle issues.  Has not seen the primary care doctor.  Encouraged him to see the primary care doctor again.    Previous history is reviewed and this is unchanged.    PAST MEDICAL/SURGICAL HISTORY  History reviewed. No pertinent past medical history.  There is no problem list on file for this patient.  Significant for generalized weakness    FAMILY HISTORY  History reviewed. No pertinent family history.   No known neurological problems in the family.  Family history is not well known.    SOCIAL HISTORY  Social History     Tobacco Use     Smoking status: Never     Smokeless tobacco: Never   Substance Use Topics     Alcohol use: Not Currently     Drug use: Never       SYSTEMS REVIEW  Twelve-system ROS was done and positive for neck pain, numbness and tingling, weakness paralysis, difficulty walking, balance coordination problems, dizziness, hearing loss, memory problems, depression.  No new symptoms/issues.    MEDICATIONS  ACCU-CHEK GUIDE test strip, TEST 1 TIME A  DAY  gabapentin (NEURONTIN) 100 MG capsule, TAKE 1-2 PILLS BY MOUTH EVERY BEDTIME AS NEEDED FOR LEG TINGLING/YOG MOB CEG TXHUA HMO NOJ 1-2 LUB THAUM MUS PW  omeprazole (PRILOSEC) 20 MG DR capsule, TAKE 1 PILL BY MOUTH 30 MINUTES BEFORE MORNING MEAL/ TXHUA HNUB NOJ 1 LUB TSHUAJ 30 NASTHIV UA NTEJ NOJ TSHAIS  predniSONE (DELTASONE) 20 MG tablet, TAKE 3 PILLS BY MOUTH EVERY DAY FOR 7 DAYS/ TXHUA HNUB NOJ 3 LUB TSHUAJ BELTRAN 7 HNUB  SYSTANE 0.4-0.3 % SOLN ophthalmic solution, USE 1 DROP INTO BOTH EYES 3 TIMES DAILY/  1 NCOS BELTRAN TXHUA SAB QHOV MUAG 3 ZAUG IB HNUB *45 DAYS*    No current facility-administered medications on file prior to visit.       PHYSICAL EXAMINATION  VITALS: BP (!) 140/94   Pulse 60   Resp 16   GENERAL: Healthy appearing, alert, no acute distress, normal habitus.  CARDIOVASCULAR: Extremities warm and well perfused. Pulses present.   EYES: Funduscopic exam does not reveal any papilledema.   NEUROLOGICAL:  Patient is awake and oriented to self, place and time.  Attention span is normal.  Memory is grossly limited; unable to test due to language barrier.  Language is fluent and follows commands appropriately.  Appropriate fund of knowledge. Cranial nerves 2-12 are intact.  No facial droop noted today.  (Previously with right forehead and lower half of the face weakness.) There is no pronator drift.  Motor exam shows 5/5 strength in all extremities grossly.  Tone is symmetric bilaterally in upper and lower extremities.  (Previously reflexes are symmetric and absent in upper extremities and lower extremities. Sensory exam is grossly intact to light touch, pin prick and vibration.)  Finger to nose and heel to shin is without dysmetria.  Gait is wide-based.  Unable to do tandem.  Exam stable compared to before.  Gait not possible today.    DIAGNOSTICS  RELEVANT LABS  A1c 5.7  CBC noncontributory  Component      Latest Ref Rng & Units 7/16/2021   Sodium      136 - 145 mmol/L 139   Potassium      3.5 - 5.0  mmol/L 4.1   Chloride      98 - 107 mmol/L 106   Carbon Dioxide      22 - 31 mmol/L 19 (L)   Anion Gap      5 - 18 mmol/L 14   Urea Nitrogen      8 - 28 mg/dL 25   Creatinine      0.60 - 1.10 mg/dL 0.68   Calcium      8.5 - 10.5 mg/dL 9.2   Glucose      70 - 125 mg/dL 99   Alkaline Phosphatase      45 - 120 U/L 84   AST      0 - 40 U/L 19   ALT      0 - 45 U/L 19   Protein Total      6.0 - 8.0 g/dL 6.8   Albumin      3.5 - 5.0 g/dL 3.8   Bilirubin Total      0.0 - 1.0 mg/dL 0.5   GFR Estimate      >60 mL/min/1.73m2 80   TSH      0.30 - 5.00 uIU/mL 0.54   Vitamin B12      213 - 816 pg/mL 614   Magnesium      1.8 - 2.6 mg/dL 2.0   Treponema Antibodies      Negative Negative         OUTSIDE RECORDS  Outside referral notes and chart notes were reviewed and pertinent information has been summarized (in addition to the HPI):-Patient referred for burning feet, generalized weakness, dementia. Complains of burning feet for the last 4 to 5 years.  Has a boot on her foot which she wears for foot swelling.  Does not completely fit the foot.  Family is also decided but history of stroke versus Bell's palsy.  Has left hand tingling which is chronic wears a splint.  Tingling is worse at night.  Hand and feet.  There are some history of diabetes.  Is on duloxetine, gabapentin for paresthesias.    MRI-images reviewed.  Left frontal infarct noted.  IMPRESSION:  1.  No evidence of acute intracranial hemorrhage, mass effect, or infarction.  2.  Chronic lacunar within the deep white matter of the left frontal lobe.  3.  Mild nonspecific white matter changes.  4.  Mild brain parenchymal volume loss.    EMG  CLINICAL INTERPRETATION:  This is an abnormal nerve conduction and EMG study.  The study shows low normal amplitudes of multiple sensory and motor nerves suggestive of an early sensorimotor polyneuropathy.  Further clinical correlation is needed.     LABS  Component      Latest Ref Rng & Units 9/16/2021          12:50 PM   Albumin %       51.0 - 67.0 % 61.8   Albumin Fraction      3.2 - 4.7 g/dL 4.1   Alpha 1 %      2.0 - 4.0 % 2.2   Alpha 1 Fraction      0.1 - 0.3 g/dL 0.1   Alpha 2 %      5.0 - 13.0 % 9.8   Alpha 2 Fraction      0.4 - 0.9 g/dL 0.7   Beta %      10.0 - 17.0 % 11.2   Beta Fraction      0.7 - 1.2 g/dL 0.8   Gamma Globulin %      9.0 - 20.0 % 15.0   Gamma Fraction      0.6 - 1.4 g/dL 1.0   ELP Interpretation:       Normal serum protein electrophoresis.   Path ICD       R52   Interpreted By       Vitaliy Limon MD   Total Protein Serum for ELP      g/dL 6.7   Immunofixation ELP          Vitamin B1 Whole Blood Level      70 - 180 nmol/L 125   Total Protein Serum for ELP      6.0 - 8.0 g/dL 6.7     Component      Latest Ref Rng & Units 9/16/2021          12:50 PM   Albumin %      51.0 - 67.0 %    Albumin Fraction      3.2 - 4.7 g/dL    Alpha 1 %      2.0 - 4.0 %    Alpha 1 Fraction      0.1 - 0.3 g/dL    Alpha 2 %      5.0 - 13.0 %    Alpha 2 Fraction      0.4 - 0.9 g/dL    Beta %      10.0 - 17.0 %    Beta Fraction      0.7 - 1.2 g/dL    Gamma Globulin %      9.0 - 20.0 %    Gamma Fraction      0.6 - 1.4 g/dL    ELP Interpretation:          Path ICD       R52   Interpreted By       Vitaliy Limon MD   Total Protein Serum for ELP      g/dL    Immunofixation ELP       No monoclonal component identified.   Vitamin B1 Whole Blood Level      70 - 180 nmol/L    Total Protein Serum for ELP      6.0 - 8.0 g/dL      LABS  Component      Latest Ref Rng 1/6/2023  10:22 AM   Cholesterol      <200 mg/dL 182    Triglycerides      <150 mg/dL 358 (H)    HDL Cholesterol      >=50 mg/dL 33 (L)    LDL Cholesterol Calculated      <=100 mg/dL 77    Non HDL Cholesterol      <130 mg/dL 149 (H)    Hemoglobin A1C      <5.7 % 5.6       Legend:  (H) High  (L) Low    Holter    ECHO  1.Left ventricular size, wall motion and function are normal. The ejection  fraction is 60-65%.  2.Normal right ventricle size and systolic function.  3.A contrast  injection (Bubble Study) was performed that was negative for flow  across the interatrial septum.  4.No hemodynamically significant valvular abnormalities on 2D or color flow  imaging.  There is no comparison study available.    Carotid US  IMPRESSION:  1.  Mild plaque formation, velocities consistent with less than 50% stenosis in the right internal carotid artery.  2.  Mild plaque formation, velocities consistent with less than 50% stenosis in the left internal carotid artery.  3.  Flow within the vertebral arteries is antegrade.      IMPRESSION/REPORT/PLAN  Facial droop -  [place] palsy-improved  Burning pain-suspect neuropathic pain  Stroke on MRI  Neuropathy  Right ankle pain    This is a 88 year old female with new onset of right facial droop.  Examination previously was suggestive of a lower motor neuron pattern.  MRI brain has come back negative for stroke that would fit with this facial droop.  Patient was treated with valacyclovir and prednisone.  She is currently asymptomatic as expected with a Bell's palsy.  Discussed prognosis with the patient and family.  No change.    In terms of burning sensation in her feet.  Examination suggestive of neuropathy.  EMG is confirmatory for neuropathy blood work overall has been negative and I suspect she has idiopathic neuropathy.  For neuropathic pain gabapentin which is helpful but not completely.  Also using miracle drug.  We will continue same dose of gabapentin for right now.  Discussed prognosis.    Right ankle pain is most likely unrelated to the neuropathy though the higher dose of gabapentin might help.  She does have appointment with primary care later today for further evaluation.  Reemphasized this.    Her MRI does show chronic infarct that does not fit with her symptoms.  Carotid ultrasound was negative for significant stenosis.  Echocardiogram was negative for PFO/thrombus.  Holter monitor did not show any atrial fibrillation.  Testing for diabetes was  negative.  LDL is slightly elevated at 77.  Goal would be less than 70.  Family does not want to start a statin right now.  Continue aspirin.  Recommend exercise and healthy lifestyle.  Further management of vascular risk factors to primary care.    Return back in 1 year or as needed for neuropathic pain.    -     gabapentin (NEURONTIN) 300 MG capsule; Take 1 cap in AM, 1 cap at noon and 2 cap at bedtime.  -     aspirin (ASA) 81 MG chewable tablet; Take 1 tablet (81 mg) by mouth daily    Return in about 1 year (around 7/10/2024), or if symptoms worsen or fail to improve, for In-Clinic Visit (must).    Over 40 minutes were spent coordinating the care for the patient on the day of the encounter.  This includes previsit, during visit and post visit activities as documented above.  Counseling patient and family.  Use of  requires extra time.  Multiple test reviewed.  Multiple problems reviewed/addressed.  Prescription management.  (Activities include but not inclusive of reviewing chart, reviewing outside records, reviewing labs and imaging study results as well as the images, patient visit time including getting history and exam,  use if applicable, review of test results with the patient and coming up with a plan in a shared model, counseling patient and family, education and answering patient questions, EMR , EMR diagnosis entry and problem list management, medication reconciliation and prescription management if applicable, paperwork if applicable, printing documents and documentation of the visit activities.)      Jos Rubio MD  Neurologist  [facility]  Tel:- [phone]    This note was dictated using voice recognition software.  Any grammatical or context distortions are unintentional and inherent to the software.        Again, thank you for allowing me to participate in the care of your patient.        Sincerely,        Jos Rubio  MD

## 2024-03-07 ENCOUNTER — LAB REQUISITION (OUTPATIENT)
Dept: LAB | Facility: CLINIC | Age: 89
End: 2024-03-07

## 2024-03-07 DIAGNOSIS — E78.2 MIXED HYPERLIPIDEMIA: ICD-10-CM

## 2024-03-07 DIAGNOSIS — E11.42 TYPE 2 DIABETES MELLITUS WITH DIABETIC POLYNEUROPATHY (H): ICD-10-CM

## 2024-03-07 PROCEDURE — 82570 ASSAY OF URINE CREATININE: CPT | Performed by: NURSE PRACTITIONER

## 2024-03-07 PROCEDURE — 80053 COMPREHEN METABOLIC PANEL: CPT | Performed by: NURSE PRACTITIONER

## 2024-03-07 PROCEDURE — 80061 LIPID PANEL: CPT | Performed by: NURSE PRACTITIONER

## 2024-03-08 LAB
ALBUMIN SERPL BCG-MCNC: 4.3 G/DL (ref 3.5–5.2)
ALP SERPL-CCNC: 82 U/L (ref 40–150)
ALT SERPL W P-5'-P-CCNC: 24 U/L (ref 0–50)
ANION GAP SERPL CALCULATED.3IONS-SCNC: 9 MMOL/L (ref 7–15)
AST SERPL W P-5'-P-CCNC: 19 U/L (ref 0–45)
BILIRUB SERPL-MCNC: 0.5 MG/DL
BUN SERPL-MCNC: 27.6 MG/DL (ref 8–23)
CALCIUM SERPL-MCNC: 9.4 MG/DL (ref 8.8–10.2)
CHLORIDE SERPL-SCNC: 103 MMOL/L (ref 98–107)
CHOLEST SERPL-MCNC: 198 MG/DL
CREAT SERPL-MCNC: 0.74 MG/DL (ref 0.51–0.95)
CREAT UR-MCNC: 68.4 MG/DL
DEPRECATED HCO3 PLAS-SCNC: 27 MMOL/L (ref 22–29)
EGFRCR SERPLBLD CKD-EPI 2021: 77 ML/MIN/1.73M2
FASTING STATUS PATIENT QL REPORTED: ABNORMAL
GLUCOSE SERPL-MCNC: 114 MG/DL (ref 70–99)
HDLC SERPL-MCNC: 39 MG/DL
LDLC SERPL CALC-MCNC: 132 MG/DL
MICROALBUMIN UR-MCNC: 22.1 MG/L
MICROALBUMIN/CREAT UR: 32.31 MG/G CR (ref 0–25)
NONHDLC SERPL-MCNC: 159 MG/DL
POTASSIUM SERPL-SCNC: 3.8 MMOL/L (ref 3.4–5.3)
PROT SERPL-MCNC: 7 G/DL (ref 6.4–8.3)
SODIUM SERPL-SCNC: 139 MMOL/L (ref 135–145)
TRIGL SERPL-MCNC: 136 MG/DL

## 2024-07-20 DIAGNOSIS — G62.9 NEUROPATHY: ICD-10-CM

## 2024-07-20 DIAGNOSIS — R52 BURNING PAIN: ICD-10-CM

## 2024-07-20 NOTE — LETTER
7/20/2024      Chuckie Burns  [address] [place]  [facility] [facility] [address]          Dear Chuckie,      We recently provided you with medication refills.  Many medications require routine follow-up with your doctor. As our neurologists are booking out several (6+) months, please contact us at your earliest convenience at [phone] to avoid any interruptions in your medication refills.     Your prescription(s) have been refilled for 30 days so you may have time for the above noted follow-up. Please call to schedule soon so we can assure you have an appointment before your next refills are needed. If you have already made a follow up appointment, please disregard this letter.     Sincerely,  [facility]

## 2024-07-22 RX ORDER — GABAPENTIN 300 MG/1
CAPSULE ORAL
Qty: 120 CAPSULE | Refills: 0 | Status: SHIPPED | OUTPATIENT
Start: 2024-07-22

## 2024-08-13 ENCOUNTER — LAB REQUISITION (OUTPATIENT)
Dept: LAB | Facility: CLINIC | Age: 89
End: 2024-08-13

## 2024-08-13 DIAGNOSIS — E11.42 TYPE 2 DIABETES MELLITUS WITH DIABETIC POLYNEUROPATHY (H): ICD-10-CM

## 2024-08-13 LAB
ANION GAP SERPL CALCULATED.3IONS-SCNC: 15 MMOL/L (ref 7–15)
BUN SERPL-MCNC: 25.6 MG/DL (ref 8–23)
CALCIUM SERPL-MCNC: 8.8 MG/DL (ref 8.8–10.4)
CHLORIDE SERPL-SCNC: 105 MMOL/L (ref 98–107)
CREAT SERPL-MCNC: 0.75 MG/DL (ref 0.51–0.95)
EGFRCR SERPLBLD CKD-EPI 2021: 76 ML/MIN/1.73M2
GLUCOSE SERPL-MCNC: 255 MG/DL (ref 70–99)
HCO3 SERPL-SCNC: 20 MMOL/L (ref 22–29)
POTASSIUM SERPL-SCNC: 3.5 MMOL/L (ref 3.4–5.3)
SODIUM SERPL-SCNC: 140 MMOL/L (ref 135–145)

## 2024-08-13 PROCEDURE — 80048 BASIC METABOLIC PNL TOTAL CA: CPT | Performed by: NURSE PRACTITIONER

## 2025-02-20 ENCOUNTER — LAB REQUISITION (OUTPATIENT)
Dept: LAB | Facility: CLINIC | Age: 89
End: 2025-02-20

## 2025-02-20 DIAGNOSIS — E11.42 TYPE 2 DIABETES MELLITUS WITH DIABETIC POLYNEUROPATHY (H): ICD-10-CM

## 2025-02-20 DIAGNOSIS — E78.2 MIXED HYPERLIPIDEMIA: ICD-10-CM

## 2025-02-20 LAB
ALBUMIN SERPL BCG-MCNC: 4.1 G/DL (ref 3.5–5.2)
ALP SERPL-CCNC: 95 U/L (ref 40–150)
ALT SERPL W P-5'-P-CCNC: 20 U/L (ref 0–50)
ANION GAP SERPL CALCULATED.3IONS-SCNC: 12 MMOL/L (ref 7–15)
AST SERPL W P-5'-P-CCNC: 21 U/L (ref 0–45)
BILIRUB SERPL-MCNC: 0.5 MG/DL
BUN SERPL-MCNC: 22.1 MG/DL (ref 8–23)
CALCIUM SERPL-MCNC: 9.3 MG/DL (ref 8.8–10.4)
CHLORIDE SERPL-SCNC: 104 MMOL/L (ref 98–107)
CHOLEST SERPL-MCNC: 215 MG/DL
CREAT SERPL-MCNC: 0.73 MG/DL (ref 0.51–0.95)
EGFRCR SERPLBLD CKD-EPI 2021: 78 ML/MIN/1.73M2
FASTING STATUS PATIENT QL REPORTED: ABNORMAL
FASTING STATUS PATIENT QL REPORTED: ABNORMAL
GLUCOSE SERPL-MCNC: 110 MG/DL (ref 70–99)
HCO3 SERPL-SCNC: 25 MMOL/L (ref 22–29)
HDLC SERPL-MCNC: 46 MG/DL
LDLC SERPL CALC-MCNC: 140 MG/DL
NONHDLC SERPL-MCNC: 169 MG/DL
POTASSIUM SERPL-SCNC: 4.1 MMOL/L (ref 3.4–5.3)
PROT SERPL-MCNC: 7 G/DL (ref 6.4–8.3)
SODIUM SERPL-SCNC: 141 MMOL/L (ref 135–145)
TRIGL SERPL-MCNC: 143 MG/DL

## 2025-02-20 PROCEDURE — 80053 COMPREHEN METABOLIC PANEL: CPT | Performed by: NURSE PRACTITIONER

## 2025-02-20 PROCEDURE — 80061 LIPID PANEL: CPT | Performed by: NURSE PRACTITIONER

## 2025-08-28 ENCOUNTER — LAB REQUISITION (OUTPATIENT)
Dept: LAB | Facility: CLINIC | Age: 89
End: 2025-08-28

## 2025-08-28 DIAGNOSIS — E11.42 TYPE 2 DIABETES MELLITUS WITH DIABETIC POLYNEUROPATHY (H): ICD-10-CM

## 2025-08-28 LAB
ALBUMIN SERPL BCG-MCNC: 3.8 G/DL (ref 3.5–5.2)
ALP SERPL-CCNC: 84 U/L (ref 40–150)
ALT SERPL W P-5'-P-CCNC: 20 U/L (ref 0–50)
ANION GAP SERPL CALCULATED.3IONS-SCNC: 10 MMOL/L (ref 7–15)
AST SERPL W P-5'-P-CCNC: 24 U/L (ref 0–45)
BILIRUB SERPL-MCNC: 0.5 MG/DL
BUN SERPL-MCNC: 31.2 MG/DL (ref 8–23)
CALCIUM SERPL-MCNC: 8.9 MG/DL (ref 8.8–10.4)
CHLORIDE SERPL-SCNC: 106 MMOL/L (ref 98–107)
CREAT SERPL-MCNC: 0.78 MG/DL (ref 0.51–0.95)
CREAT UR-MCNC: 92.3 MG/DL
EGFRCR SERPLBLD CKD-EPI 2021: 72 ML/MIN/1.73M2
GLUCOSE SERPL-MCNC: 113 MG/DL (ref 70–99)
HCO3 SERPL-SCNC: 23 MMOL/L (ref 22–29)
MICROALBUMIN UR-MCNC: <12 MG/L
MICROALBUMIN/CREAT UR: NORMAL MG/G{CREAT}
POTASSIUM SERPL-SCNC: 4.2 MMOL/L (ref 3.4–5.3)
PROT SERPL-MCNC: 6.9 G/DL (ref 6.4–8.3)
SODIUM SERPL-SCNC: 139 MMOL/L (ref 135–145)

## 2025-08-28 PROCEDURE — 82043 UR ALBUMIN QUANTITATIVE: CPT | Performed by: NURSE PRACTITIONER

## 2025-08-28 PROCEDURE — 84155 ASSAY OF PROTEIN SERUM: CPT | Performed by: NURSE PRACTITIONER
